# Patient Record
Sex: FEMALE | Race: WHITE | NOT HISPANIC OR LATINO | Employment: FULL TIME | URBAN - METROPOLITAN AREA
[De-identification: names, ages, dates, MRNs, and addresses within clinical notes are randomized per-mention and may not be internally consistent; named-entity substitution may affect disease eponyms.]

---

## 2018-06-06 ENCOUNTER — PROCEDURE VISIT (OUTPATIENT)
Dept: FAMILY MEDICINE | Facility: CLINIC | Age: 33
End: 2018-06-06
Payer: COMMERCIAL

## 2018-06-06 ENCOUNTER — HOSPITAL ENCOUNTER (EMERGENCY)
Facility: HOSPITAL | Age: 33
Discharge: HOME | End: 2018-06-06
Attending: EMERGENCY MEDICINE
Payer: COMMERCIAL

## 2018-06-06 ENCOUNTER — APPOINTMENT (OUTPATIENT)
Dept: CT IMAGING | Facility: HOSPITAL | Age: 33
End: 2018-06-06
Payer: COMMERCIAL

## 2018-06-06 ENCOUNTER — TELEPHONE (OUTPATIENT)
Dept: EMERGENCY MEDICINE | Facility: HOSPITAL | Age: 33
End: 2018-06-06

## 2018-06-06 ENCOUNTER — APPOINTMENT (OUTPATIENT)
Dept: FAMILY MEDICINE | Facility: CLINIC | Age: 33
End: 2018-06-06
Payer: COMMERCIAL

## 2018-06-06 VITALS
HEART RATE: 73 BPM | DIASTOLIC BLOOD PRESSURE: 58 MMHG | SYSTOLIC BLOOD PRESSURE: 116 MMHG | OXYGEN SATURATION: 98 % | RESPIRATION RATE: 18 BRPM

## 2018-06-06 VITALS
WEIGHT: 162.04 LBS | DIASTOLIC BLOOD PRESSURE: 60 MMHG | TEMPERATURE: 98.4 F | RESPIRATION RATE: 20 BRPM | OXYGEN SATURATION: 98 % | SYSTOLIC BLOOD PRESSURE: 91 MMHG | HEIGHT: 60 IN | BODY MASS INDEX: 31.81 KG/M2 | HEART RATE: 71 BPM

## 2018-06-06 DIAGNOSIS — Z30.432 ENCOUNTER FOR IUD REMOVAL: Primary | ICD-10-CM

## 2018-06-06 DIAGNOSIS — K59.00 CONSTIPATION, UNSPECIFIED CONSTIPATION TYPE: ICD-10-CM

## 2018-06-06 DIAGNOSIS — R10.10 PAIN OF UPPER ABDOMEN: Primary | ICD-10-CM

## 2018-06-06 LAB
ALBUMIN SERPL-MCNC: 4.2 G/DL (ref 3.5–5.3)
ALP SERPL-CCNC: 65 U/L (ref 37–98)
ALT SERPL-CCNC: 10 U/L (ref 0–52)
ANION GAP SERPL CALC-SCNC: 8 MMOL/L (ref 3–11)
AST SERPL-CCNC: 10 U/L (ref 0–39)
BACTERIA #/AREA URNS AUTO: ABNORMAL /HPF
BASOPHILS # BLD AUTO: 0 10*3/UL
BASOPHILS NFR BLD AUTO: 0 % (ref 0–2)
BILIRUB SERPL-MCNC: 0.38 MG/DL (ref 0–1.4)
BILIRUB UR QL STRIP.AUTO: NEGATIVE
BUN SERPL-MCNC: 7 MG/DL (ref 7–25)
CALCIUM ALBUM COR SERPL-MCNC: 8.9 MG/DL (ref 8.5–10.1)
CALCIUM SERPL-MCNC: 9.1 MG/DL (ref 8.6–10.3)
CHLORIDE SERPL-SCNC: 104 MMOL/L (ref 98–107)
CLARITY UR: ABNORMAL
CO2 SERPL-SCNC: 24 MMOL/L (ref 21–32)
COLOR UR: YELLOW
CREAT SERPL-MCNC: 0.7 MG/DL (ref 0.6–1.2)
EOSINOPHIL # BLD AUTO: 0.3 10*3/UL
EOSINOPHIL NFR BLD AUTO: 3 % (ref 0–3)
ERYTHROCYTE [DISTWIDTH] IN BLOOD BY AUTOMATED COUNT: 12.7 % (ref 11.5–14)
GFR SERPL CREATININE-BSD FRML MDRD: 96 ML/MIN/1.73M*2
GLUCOSE SERPL-MCNC: 100 MG/DL (ref 70–105)
GLUCOSE UR STRIP.AUTO-MCNC: NEGATIVE MG/DL
HCG SERPL QL: NEGATIVE
HCT VFR BLD AUTO: 39.6 % (ref 34–45)
HGB BLD-MCNC: 14.2 G/DL (ref 11.5–15.5)
HGB UR QL STRIP.AUTO: ABNORMAL
KETONES UR STRIP.AUTO-MCNC: NEGATIVE MG/DL
LEUKOCYTE ESTERASE UR QL STRIP: ABNORMAL
LIPASE SERPL-CCNC: 12 U/L (ref 11–82)
LYMPHOCYTES # BLD AUTO: 2.9 10*3/UL
LYMPHOCYTES NFR BLD AUTO: 26 % (ref 11–47)
MCH RBC QN AUTO: 31.6 PG (ref 28–33)
MCHC RBC AUTO-ENTMCNC: 35.8 G/DL (ref 32–36)
MCV RBC AUTO: 88.1 FL (ref 81–97)
MONOCYTES # BLD AUTO: 0.7 10*3/UL
MONOCYTES NFR BLD AUTO: 7 % (ref 3–11)
NEUTROPHILS # BLD AUTO: 7.1 10*3/UL
NEUTROPHILS NFR BLD AUTO: 64 % (ref 41–81)
NITRITE UR QL STRIP.AUTO: NEGATIVE
PH UR STRIP.AUTO: 6 PH
PLATELET # BLD AUTO: 294 10*3/UL (ref 140–350)
PMV BLD AUTO: 7.1 FL (ref 6.9–10.8)
POTASSIUM SERPL-SCNC: 3.7 MMOL/L (ref 3.5–5.1)
PROT SERPL-MCNC: 6.9 G/DL (ref 6–8.3)
PROT UR STRIP.AUTO-MCNC: NEGATIVE MG/DL
RBC # BLD AUTO: 4.5 10*6/ΜL (ref 3.7–5.3)
RBC #/AREA URNS AUTO: ABNORMAL /HPF
SODIUM SERPL-SCNC: 136 MMOL/L (ref 135–145)
SP GR UR STRIP.AUTO: 1.01 (ref 1–1.03)
SQUAMOUS #/AREA URNS AUTO: ABNORMAL /HPF
UROBILINOGEN UR STRIP.AUTO-MCNC: <2 E.U./DL
WBC # BLD AUTO: 11.2 10*3/UL (ref 4.5–10.5)
WBC #/AREA URNS AUTO: ABNORMAL /HPF

## 2018-06-06 PROCEDURE — 99285 EMERGENCY DEPT VISIT HI MDM: CPT | Performed by: EMERGENCY MEDICINE

## 2018-06-06 PROCEDURE — 99284 EMERGENCY DEPT VISIT MOD MDM: CPT

## 2018-06-06 PROCEDURE — 96374 THER/PROPH/DIAG INJ IV PUSH: CPT

## 2018-06-06 PROCEDURE — 2590000100 HC RX 259: Performed by: NURSE PRACTITIONER

## 2018-06-06 PROCEDURE — 58301 REMOVE INTRAUTERINE DEVICE: CPT | Mod: NC | Performed by: FAMILY MEDICINE

## 2018-06-06 PROCEDURE — 6360000200 HC RX 636 W HCPCS (ALT 250 FOR IP): Performed by: NURSE PRACTITIONER

## 2018-06-06 PROCEDURE — 87088 URINE BACTERIA CULTURE: CPT | Performed by: NURSE PRACTITIONER

## 2018-06-06 PROCEDURE — 80053 COMPREHEN METABOLIC PANEL: CPT | Performed by: NURSE PRACTITIONER

## 2018-06-06 PROCEDURE — 81001 URINALYSIS AUTO W/SCOPE: CPT | Performed by: NURSE PRACTITIONER

## 2018-06-06 PROCEDURE — 2550000100 HC RX 255: Performed by: NURSE PRACTITIONER

## 2018-06-06 PROCEDURE — 85025 COMPLETE CBC W/AUTO DIFF WBC: CPT | Performed by: NURSE PRACTITIONER

## 2018-06-06 PROCEDURE — 74177 CT ABD & PELVIS W/CONTRAST: CPT

## 2018-06-06 PROCEDURE — 84703 CHORIONIC GONADOTROPIN ASSAY: CPT | Performed by: NURSE PRACTITIONER

## 2018-06-06 PROCEDURE — 2500000200 HC RX 250 WO HCPCS: Performed by: NURSE PRACTITIONER

## 2018-06-06 PROCEDURE — 83690 ASSAY OF LIPASE: CPT | Performed by: NURSE PRACTITIONER

## 2018-06-06 PROCEDURE — 58301 REMOVE INTRAUTERINE DEVICE: CPT | Performed by: FAMILY MEDICINE

## 2018-06-06 PROCEDURE — 2580000300 HC RX 258: Performed by: NURSE PRACTITIONER

## 2018-06-06 PROCEDURE — 96375 TX/PRO/DX INJ NEW DRUG ADDON: CPT

## 2018-06-06 RX ORDER — SODIUM CHLORIDE 9 MG/ML
1000 INJECTION, SOLUTION INTRAVENOUS ONCE
Status: COMPLETED | OUTPATIENT
Start: 2018-06-06 | End: 2018-06-06

## 2018-06-06 RX ORDER — MORPHINE SULFATE 4 MG/ML
4 INJECTION, SOLUTION INTRAMUSCULAR; INTRAVENOUS ONCE
Status: COMPLETED | OUTPATIENT
Start: 2018-06-06 | End: 2018-06-06

## 2018-06-06 RX ORDER — IOPAMIDOL 755 MG/ML
105 INJECTION, SOLUTION INTRAVASCULAR ONCE
Status: COMPLETED | OUTPATIENT
Start: 2018-06-06 | End: 2018-06-06

## 2018-06-06 RX ORDER — ONDANSETRON HYDROCHLORIDE 2 MG/ML
4 INJECTION, SOLUTION INTRAVENOUS ONCE
Status: COMPLETED | OUTPATIENT
Start: 2018-06-06 | End: 2018-06-06

## 2018-06-06 RX ADMIN — IOPAMIDOL 105 ML: 755 INJECTION, SOLUTION INTRAVENOUS at 05:10

## 2018-06-06 RX ADMIN — ONDANSETRON 4 MG: 2 INJECTION INTRAMUSCULAR; INTRAVENOUS at 04:26

## 2018-06-06 RX ADMIN — SODIUM CHLORIDE 1000 ML: 9 INJECTION, SOLUTION INTRAVENOUS at 04:25

## 2018-06-06 RX ADMIN — LIDOCAINE HYDROCHLORIDE 45 ML: 20 SOLUTION ORAL; TOPICAL at 04:22

## 2018-06-06 RX ADMIN — MORPHINE SULFATE 4 MG: 4 INJECTION INTRAVENOUS at 04:28

## 2018-06-06 ASSESSMENT — ENCOUNTER SYMPTOMS
BACK PAIN: 0
SHORTNESS OF BREATH: 0
SEIZURES: 0
PALPITATIONS: 0
ARTHRALGIAS: 0
HEMATURIA: 0
VOMITING: 0
BRUISES/BLEEDS EASILY: 0
COLOR CHANGE: 0
ADENOPATHY: 0
COUGH: 0
FEVER: 0
ABDOMINAL PAIN: 1
CHILLS: 0
DYSURIA: 0

## 2018-06-06 ASSESSMENT — PAIN DESCRIPTION - DESCRIPTORS: DESCRIPTORS: SHARP

## 2018-06-06 NOTE — ED PROVIDER NOTES
HPI:  Chief Complaint   Patient presents with   • Abdominal Pain     pain x 5 days, has not been able to eat the last three days, c/o nausea, denies vomiting       32-year-old female presents with 3 days of constant generalized abdominal distention, bloating and dull discomfort rated at a 7 out of 10 that is continuous and not improved by the use of ibuprofen or Pepto-Bismol.  She also reports associated symptoms of feeling hot and sweaty without fever or chills.  She denies any dysuria.  Last menstrual period began June 1, 2017 and this was normal for her.  Her bowel movements has altered between solid and liquid without improvement of her distended feeling.  She also reports associated anorexia.  In addition she reports some nausea without vomiting.  States a similar episode 2 weeks ago for 3-4 days that removal resolved on its own where her nausea was more prominent at that time.  Symptoms do not improve or worsen with any dietary changes.  She does not suspect any suspicious food intake.  No previous abdominal surgeries.        Abdominal Pain   Associated symptoms: no chest pain, no chills, no cough, no dysuria, no fever, no hematuria, no shortness of breath, no vaginal bleeding, no vaginal discharge and no vomiting        HISTORY:  Past Medical History:   Diagnosis Date   • Fallot tetralogy    • Ventricular septal defect        Past Surgical History:   Procedure Laterality Date   • CARDIAC SURGERY     • TONSILLECTOMY         History reviewed. No pertinent family history.    Social History   Substance Use Topics   • Smoking status: Current Every Day Smoker     Packs/day: 0.50     Types: Cigarettes   • Smokeless tobacco: Never Used   • Alcohol use Yes      Comment: rarely       ROS:  Review of Systems   Constitutional: Negative for chills and fever.   HENT: Negative.    Eyes: Negative for visual disturbance.   Respiratory: Negative for cough and shortness of breath.    Cardiovascular: Negative for chest pain  and palpitations.   Gastrointestinal: Positive for abdominal pain. Negative for vomiting.   Endocrine: Positive for heat intolerance.   Genitourinary: Negative for dysuria, hematuria, vaginal bleeding, vaginal discharge and vaginal pain.   Musculoskeletal: Negative for arthralgias and back pain.   Skin: Negative for color change and rash.   Neurological: Negative for seizures and syncope.   Hematological: Negative for adenopathy. Does not bruise/bleed easily.   All other systems reviewed and are negative.      PE:  ED Triage Vitals   Temp Heart Rate Resp BP SpO2   06/06/18 0338 06/06/18 0338 06/06/18 0338 06/06/18 0338 06/06/18 0338   36.9 °C (98.4 °F) 82 16 125/67 95 %      Temp Source Heart Rate Source Patient Position BP Location FiO2 (%)   06/06/18 0338 -- 06/06/18 0430 -- --   Oral  Sitting         Physical Exam   Constitutional: She is oriented to person, place, and time. She appears well-developed and well-nourished. No distress.   HENT:   Head: Normocephalic and atraumatic.   Eyes: Conjunctivae are normal.   Neck: Neck supple.   Cardiovascular: Normal rate, regular rhythm and normal pulses.    Murmur heard.   Systolic murmur is present with a grade of 3/6    No diastolic murmur is present   Pulmonary/Chest: Effort normal and breath sounds normal. No respiratory distress.   Abdominal: Soft. Bowel sounds are normal. She exhibits distension. She exhibits no fluid wave and no abdominal bruit. There is no hepatosplenomegaly. There is tenderness in the right upper quadrant, right lower quadrant, epigastric area and left upper quadrant. There is tenderness at McBurney's point. There is no rigidity, no rebound, no guarding and no CVA tenderness.   Musculoskeletal: Normal range of motion. She exhibits no edema.   Neurological: She is alert and oriented to person, place, and time.   Skin: Skin is warm and dry.   Psychiatric: She has a normal mood and affect.   Nursing note and vitals reviewed.      ED LABS:  Labs  Reviewed   CBC WITH AUTO DIFFERENTIAL - Abnormal        Result Value    WBC 11.2 (*)     RBC 4.50      Hemoglobin 14.2      Hematocrit 39.6      MCV 88.1      MCH 31.6      MCHC 35.8      RDW 12.7      Platelets 294      MPV 7.1      Neutrophils% 64      Lymphocytes% 26      Monocytes% 7      Eosinophils% 3      Basophils% 0      Neutrophils Absolute 7.10      Lymphocytes Absolute 2.90      Monocytes Absolute 0.70      Eosinophils Absolute 0.30      Basophils Absolute 0.00     URINALYSIS, MICROSCOPIC ONLY - Abnormal     RBC, Urine 15-29 (*)     WBC, Urine 0-4      Squamous Epithelial, Urine 0-4      Bacteria, Urine None seen     URINALYSIS, DIPSTICK ONLY, FOR USE WITH MICROSCOPIC PANEL - Abnormal     Color, Urine Yellow      Clarity, Urine Slightly Cloudy (*)     Specific Gravity, Urine 1.015      Leukocytes, Urine Moderate (*)     Nitrite, Urine Negative      Protein, Urine Negative      Ketones, Urine Negative      Urobilinogen, Urine <2.0      Bilirubin, Urine Negative      Blood, Urine Large (*)     Glucose, Urine Negative      pH, Urine 6.0     COMPREHENSIVE METABOLIC PANEL - Normal    Sodium 136      Potassium 3.7      Chloride 104      CO2 24      Anion Gap 8      BUN 7      Creatinine 0.7      Glucose 100      Calcium 9.1      AST 10      ALT (SGPT) 10      Alkaline Phosphatase 65      Total Protein 6.9      Albumin 4.2      Total Bilirubin 0.38      eGFR 96      Corrected Calcium 8.9      Narrative:     ESTIMATED GFR CALCULATED USING THE IDMS-TRACEABLE MDRD STUDY EQUATION WITH THE RESULT NORMALIZED TO 1.73M^2 BODY SURFACE AREA.    AVERAGE GFR BY AGE RANGE     20-30 years 116 mL/min/1.73m^2  30-40 years 107 mL/min/1.73m^2  40-50 years 99 mL/min/1.73m^2  50-60 years 93 mL/min/1.73m^2  60-70 years 85 mL/min/1.73m^2  70-up years 75 mL/min/1.73m^2   LIPASE - Normal    Lipase 12     HCG, RAPID QUALITATIVE, SERUM    HCG qualitative Negative     URINALYSIS WITH MICROSCOPIC    Narrative:     The following orders  were created for panel order Urinalysis w/microscopic Urine, Clean Catch.  Procedure                               Abnormality         Status                     ---------                               -----------         ------                     Urinalysis, microscopic U...[[id number]]  Abnormal            Final result               Urinalysis, dipstick Urin...[[id number]]  Abnormal            Final result                 Please view results for these tests on the individual orders.         ED IMAGES:  CT ABDOMEN PELVIS W IV CONTRAST No Oral Contrast   Final Result   Impression:    IUD with one of the struts appearing in the posterior myometrium to the serosal surface. Would correlate with when the IUD was placed and whether it could contribute to the patient's abdominal pain.      3 mm pulmonary nodule in the left lower lobe. Per Fleischner criteria, follow-up is not needed.      REPORT NOTE:   This study has been reviewed by [facility]eradiology services with faxing of similar results to the [facility] ED at the time of the exam. No comment about the location of the IUD was made.             ED PROCEDURES:  Procedures    ED COURSE:  ED Course as of Jun 07 1902 Wed Jun 06, 2018   0509 Symptoms somewhat improved although not completely resolved.  CT results indicate moderate fecal retention.  3 mm nodule left lower lobe that will need follow-up with primary care.  Patient has been provided with family medicine residency and [facility] numbers in order to establish care and follow-up.  She is comfortable with discharge.  [AJ]      ED Course User Index  [AJ] Ana Gloria CNP       MDM:  MDM  32-year-old female presents with recurrent abdominal distention bloating and ongoing discomfort.  She initially describes upper abdomen and epigastric discomfort however on exam there is right lower quadrant tenderness without rebound tenderness or guarding.  She does have her gallbladder and appendix.  She  denies any vaginal discharge or to suspect pelvic etiology will obtain CBC, CMP, lipase, hCG and CT of the abdomen and pelvis with IV contrast.  In the interim provide pain medications and antiemetics.  Final diagnoses:   [R10.10] Pain of upper abdomen   [K59.00] Constipation, unspecified constipation type        Ana Gloria CNP  06/07/18 7991

## 2018-06-06 NOTE — DISCHARGE INSTRUCTIONS
Your CAT scan of the abdomen does not show significant abnormalities such as appendicitis, gallbladder problems or intestinal blockage.  Please follow with family medicine residency clinic or [facility] or primary care provider of your choice in follow-up for your symptoms.

## 2018-06-06 NOTE — ED ATTESTATION NOTE
I performed a history and physical examination of Kenya Lopez and discussed her   management with Advanced Practice Provider, Ana Gloria CNP.  I agree with the history, physical, assessment, and plan of care.  On my face-to-face visit with patient,  She is in no acute distress, abdominal exam is benign.      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  She has not established primary care in the area.  She just started a new job yesterday.  She reports symptoms of alternating constipation and diarrhea.  Pain at times feels like extra gas and constipation but is not alleviated with bowel movements.  She is somewhat vague as to why she is not establish primary care, cardiology care in the area or anything else.  No job was started yesterday.  Denies any dysuria.  She recently had a period.  She has an IUD in place.  No fevers.  No chest pain or shortness of breath.      Results for orders placed or performed during the hospital encounter of 06/06/18   CBC w/auto differential Blood, Venous   Result Value Ref Range    WBC 11.2 (H) 4.5 - 10.5 10*3/uL    RBC 4.50 3.70 - 5.30 10*6/µL    Hemoglobin 14.2 11.5 - 15.5 g/dL    Hematocrit 39.6 34.0 - 45.0 %    MCV 88.1 81.0 - 97.0 fL    MCH 31.6 28.0 - 33.0 pg    MCHC 35.8 32.0 - 36.0 g/dL    RDW 12.7 11.5 - 14.0 %    Platelets 294 140 - 350 10*3/uL    MPV 7.1 6.9 - 10.8 fL    Neutrophils% 64 41 - 81 %    Lymphocytes% 26 11 - 47 %    Monocytes% 7 3 - 11 %    Eosinophils% 3 0 - 3 %    Basophils% 0 0 - 2 %    Neutrophils Absolute 7.10 10*3/uL    Lymphocytes Absolute 2.90 10*3/uL    Monocytes Absolute 0.70 10*3/uL    Eosinophils Absolute 0.30 10*3/uL    Basophils Absolute 0.00 10*3/uL   Comprehensive metabolic panel Blood, Venous   Result Value Ref Range    Sodium 136 135 - 145 mmol/L    Potassium 3.7 3.5 - 5.1 mmol/L    Chloride 104 98 - 107 mmol/L    CO2 24 21 - 32 mmol/L    Anion Gap 8 3 - 11 mmol/L    BUN 7 7 - 25 mg/dL    Creatinine 0.7 0.6 - 1.2  mg/dL    Glucose 100 70 - 105 mg/dL    Calcium 9.1 8.6 - 10.3 mg/dL    AST 10 0 - 39 U/L    ALT (SGPT) 10 0 - 52 U/L    Alkaline Phosphatase 65 37 - 98 U/L    Total Protein 6.9 6.0 - 8.3 g/dL    Albumin 4.2 3.5 - 5.3 g/dL    Total Bilirubin 0.38 0.00 - 1.40 mg/dL    eGFR 96 >60 mL/min/1.73m*2    Corrected Calcium 8.9 8.5 - 10.1 mg/dL   Lipase Blood, Venous   Result Value Ref Range    Lipase 12 11 - 82 U/L   HCG, rapid qualitative, serum Blood, Venous   Result Value Ref Range    HCG qualitative Negative    Urinalysis, microscopic Urine, Clean Catch   Result Value Ref Range    RBC, Urine 15-29 (A) None seen, 0-4, Negative /HPF    WBC, Urine 0-4 0 - 4 /HPF    Squamous Epithelial, Urine 0-4 None Seen-9 /HPF    Bacteria, Urine None seen None seen, Few /HPF   Urinalysis, dipstick Urine, Clean Catch   Result Value Ref Range    Color, Urine Yellow Yellow    Clarity, Urine Slightly Cloudy (A) Clear    Specific Gravity, Urine 1.015 1.003 - 1.030    Leukocytes, Urine Moderate (A) Negative    Nitrite, Urine Negative Negative    Protein, Urine Negative Negative mg/dL    Ketones, Urine Negative Negative mg/dL    Urobilinogen, Urine <2.0 <2.0 E.U./dL    Bilirubin, Urine Negative Negative    Blood, Urine Large (A) Negative    Glucose, Urine Negative Negative mg/dL    pH, Urine 6.0 5.0 - 8.0 PH         CT per [facility] shows normal appendix, moderate fecal retention, IUD in place, 3 mm nodule in the left lower lobe on image 9.    No clear etiology of patient's abdominal pain is elicited on laboratory diagnostics on her CT evaluation today.  She does have some mild hematuria without signs of infection.  No dysuria.  She did just recently finished her menses.  Will however add a urine culture but hold on any antibiotics at this time.  Will only call with positive results.  I strongly encouraged her to establish with a primary care provider.  She does describe symptoms of alternating constipation and diarrhea this may be  related to an IBS situation.  With her history and comorbidity she is certainly in need of a primary care provider, cardiologist if she is staying in the area.  Information has been provided.        MD ABEL MARIN MD Kirsten Busse, MD  06/06/18 3417

## 2018-06-06 NOTE — PROGRESS NOTES
Subjective      Kenya Lopez is a 32 y.o. female who presents for IUD removal.    HPI   Kenya is a 32-year-old female recently seen in the ED for lower mid abdominal pain.  Her workup was unremarkable beyond the CT which suggested her IUD may be in the myometrium.  She followed up with our clinic today, the case was discussed with OB/GYN who recommended removal of the IUD as the hormonal type can sometimes dissolve into the uterus itself.    She does think she has had symptoms related to the IUD for months.  She notes some bloating, abdominal pain, change in bowel habits over the past few weeks.    She is sexually active.  One partner.    The following have been reviewed and updated as appropriate in this visit:  No text in SmartText        No Known Allergies  Prior to Admission medications    Not on File      Past Medical History:   Diagnosis Date   • Fallot tetralogy    • Ventricular septal defect      Past Surgical History:   Procedure Laterality Date   • CARDIAC SURGERY     • TONSILLECTOMY       No family history on file.  Social History     Social History   • Marital status: Single     Spouse name: N/A   • Number of children: N/A   • Years of education: N/A     Social History Main Topics   • Smoking status: Current Every Day Smoker     Packs/day: 0.50     Types: Cigarettes   • Smokeless tobacco: Never Used   • Alcohol use Yes      Comment: rarely   • Drug use: No   • Sexual activity: Defer     Other Topics Concern   • None     Social History Narrative   • None       10 point ROS negative except as in HPI    Objective     Vital Signs:  /58 (BP Location: Left arm, Patient Position: Sitting, Cuff Size: Reg)   Pulse 73   Resp 18   SpO2 98%     Labs:  Results for orders placed or performed during the hospital encounter of 06/06/18   CBC w/auto differential Blood, Venous   Result Value Ref Range    WBC 11.2 (H) 4.5 - 10.5 10*3/uL    RBC 4.50 3.70 - 5.30 10*6/µL    Hemoglobin 14.2 11.5 - 15.5 g/dL     Hematocrit 39.6 34.0 - 45.0 %    MCV 88.1 81.0 - 97.0 fL    MCH 31.6 28.0 - 33.0 pg    MCHC 35.8 32.0 - 36.0 g/dL    RDW 12.7 11.5 - 14.0 %    Platelets 294 140 - 350 10*3/uL    MPV 7.1 6.9 - 10.8 fL    Neutrophils% 64 41 - 81 %    Lymphocytes% 26 11 - 47 %    Monocytes% 7 3 - 11 %    Eosinophils% 3 0 - 3 %    Basophils% 0 0 - 2 %    Neutrophils Absolute 7.10 10*3/uL    Lymphocytes Absolute 2.90 10*3/uL    Monocytes Absolute 0.70 10*3/uL    Eosinophils Absolute 0.30 10*3/uL    Basophils Absolute 0.00 10*3/uL   Comprehensive metabolic panel Blood, Venous   Result Value Ref Range    Sodium 136 135 - 145 mmol/L    Potassium 3.7 3.5 - 5.1 mmol/L    Chloride 104 98 - 107 mmol/L    CO2 24 21 - 32 mmol/L    Anion Gap 8 3 - 11 mmol/L    BUN 7 7 - 25 mg/dL    Creatinine 0.7 0.6 - 1.2 mg/dL    Glucose 100 70 - 105 mg/dL    Calcium 9.1 8.6 - 10.3 mg/dL    AST 10 0 - 39 U/L    ALT (SGPT) 10 0 - 52 U/L    Alkaline Phosphatase 65 37 - 98 U/L    Total Protein 6.9 6.0 - 8.3 g/dL    Albumin 4.2 3.5 - 5.3 g/dL    Total Bilirubin 0.38 0.00 - 1.40 mg/dL    eGFR 96 >60 mL/min/1.73m*2    Corrected Calcium 8.9 8.5 - 10.1 mg/dL   Lipase Blood, Venous   Result Value Ref Range    Lipase 12 11 - 82 U/L   HCG, rapid qualitative, serum Blood, Venous   Result Value Ref Range    HCG qualitative Negative    Urinalysis, microscopic Urine, Clean Catch   Result Value Ref Range    RBC, Urine 15-29 (A) None seen, 0-4, Negative /HPF    WBC, Urine 0-4 0 - 4 /HPF    Squamous Epithelial, Urine 0-4 None Seen-9 /HPF    Bacteria, Urine None seen None seen, Few /HPF   Urinalysis, dipstick Urine, Clean Catch   Result Value Ref Range    Color, Urine Yellow Yellow    Clarity, Urine Slightly Cloudy (A) Clear    Specific Gravity, Urine 1.015 1.003 - 1.030    Leukocytes, Urine Moderate (A) Negative    Nitrite, Urine Negative Negative    Protein, Urine Negative Negative mg/dL    Ketones, Urine Negative Negative mg/dL    Urobilinogen, Urine <2.0 <2.0 E.U./dL     Bilirubin, Urine Negative Negative    Blood, Urine Large (A) Negative    Glucose, Urine Negative Negative mg/dL    pH, Urine 6.0 5.0 - 8.0 PH       Physical Exam:  GENERAL: No acute distress, average body habitus, no foul or apparent body odors  HEENT: Head normocephalic atraumatic.   ABDOMEN: Soft, nondistended, nontender.   NEUROLOGIC: No focal deficits, CN II-XII grossly intact  PSYCHIATRIC: Alert, calm, cooperative, friendly to examiner  SKIN: Without rashes or bruising  EXTREMITIES: No edema  GYN: Normal-appearing vaginal mucosa, cervix without exudate, soft 2 strings present    IUD Removal  Date/Time: 6/6/2018 4:11 PM  Performed by: ONIEL BURGOS      Consent  Verbal consent was obtained from the patient. Written consent was obtained from the patient. Risks, benefits, and alternatives were discussed. Consent given by patient. The patient states understanding of the procedure being performed. Patient ID confirmed verbally with patient.       Pre-Procedure  A Mirena IUD was originally inserted on 1/1/2014. IUD removal due to side effects.     Procedure Details   Kenya was placed in a dorsal lithotomy position. Pelvic exam performed and was normal. Speculum was inserted into the vagina. IUD strings grasped with ring forceps. IUD removed entirely intact and without complication.     Post-Procedure  Patient tolerated the procedure well with no complications.     Procedure Comments  W/ tenaculum control      Assessment/Plan   Diagnoses and all orders for this visit:    Encounter for IUD removal  IUD removed without complication.  Monitor for worsening bleeding.  Expect some cramping and spotting over the next few days.  Recommended abstinence her at a minimum condoms for the next few weeks.  She will call to let us know which birth control option she would like to pursue.  Return if any other concerns arise.      ONIEL BURGOS MD

## 2018-06-07 LAB — BACTERIA UR CULT: NORMAL

## 2018-06-07 NOTE — PROGRESS NOTES
FYI.  This patient has an appointment with you on 6/20/18 and will be establishing care.  Also appears that she has a tetrology of fallot s/p repair

## 2018-06-07 NOTE — ED NOTES
Patient called back after Dr. Rivera called to check on her earlier today. Patient educated that it is very important to have follow-up with PCP or OB/GYN due to radiologist over-read of CT scan regarding the placement of her IUD.  Patient voiced understanding and states will make appointment as soon as we hung up phone.     NICOLAS Bianchi  06/06/18 [id number]

## 2018-06-13 ENCOUNTER — OFFICE VISIT (OUTPATIENT)
Dept: FAMILY MEDICINE | Facility: CLINIC | Age: 33
End: 2018-06-13
Payer: COMMERCIAL

## 2018-06-13 VITALS
BODY MASS INDEX: 31.05 KG/M2 | DIASTOLIC BLOOD PRESSURE: 73 MMHG | OXYGEN SATURATION: 97 % | RESPIRATION RATE: 18 BRPM | WEIGHT: 159 LBS | SYSTOLIC BLOOD PRESSURE: 108 MMHG | HEART RATE: 77 BPM | TEMPERATURE: 97.1 F

## 2018-06-13 DIAGNOSIS — R10.30 LOWER ABDOMINAL PAIN: Primary | ICD-10-CM

## 2018-06-13 PROBLEM — Z30.432 ENCOUNTER FOR IUD REMOVAL: Status: RESOLVED | Noted: 2018-06-06 | Resolved: 2018-06-13

## 2018-06-13 LAB
BACTERIA #/AREA URNS HPF: NORMAL /HPF
BILIRUB UR QL: NEGATIVE
CLARITY UR: CLEAR
COLOR UR: YELLOW
GLUCOSE UR QL: NEGATIVE MG/DL
HGB UR QL: ABNORMAL
KETONES UR-MCNC: NEGATIVE MG/DL
LEUKOCYTE ESTERASE UR QL STRIP: NEGATIVE
NITRITE UR QL: NEGATIVE
PH UR: 6.5 PH
PROT UR STRIP-MCNC: NEGATIVE MG/DL
RBC #/AREA URNS HPF: NORMAL /HPF
SP GR UR: <=1.005 (ref 1–1.03)
SQUAMOUS #/AREA URNS HPF: NORMAL /HPF
UROBILINOGEN UR-MCNC: 0.2 E.U./DL
WBC #/AREA URNS HPF: NORMAL /HPF

## 2018-06-13 PROCEDURE — 99213 OFFICE O/P EST LOW 20 MIN: CPT | Performed by: FAMILY MEDICINE

## 2018-06-13 PROCEDURE — 81001 URINALYSIS AUTO W/SCOPE: CPT | Performed by: FAMILY MEDICINE

## 2018-06-13 ASSESSMENT — PAIN SCALES - GENERAL: PAINLEVEL: 3

## 2018-06-13 NOTE — PROGRESS NOTES
Subjective      Kenya Lopez is a 32 y.o. female who presents for follow-up stomach pain    HPI   Eileen is a 32-year-old  female.  She notes lower mid abdominal pain beginning about 3.5 weeks ago.    Went away after 4-5 days, came back.  When the pain came back she then went to the ED , had labs which were normal, UA with hematuria, leukocyte esterase but not a rip roaring UTI, CT which was normal other than IUD which was potentially in the myometrium.      Had removed here a day later at the recommendation of OB/GYN.  Pain resolved for a day or 2.    Pain came back severe again 6/10 - during the night pain woke her up, was hurting down low in abdomen, lowest part of stomach, down and into groin, severe, every time she goes to the bathroom it 'hurts' when she feels like bladder is full.    It hurts every time she pees, more the muscles of the bladder then the introitus, no periurethral pains    No blood in urine, no fevers/chills, no flank pain    Regular bowel movements, thought it was gas initially, no relief with passing gas, no relief with bowel movements.    Is lactose intolerant - tried to avoid, doesn't seem to effect symptoms      -  x3, no real issues with these, last kid born     The following have been reviewed and updated as appropriate in this visit:  No text in SmartText        No Known Allergies  Prior to Admission medications    Not on File      Past Medical History:   Diagnosis Date   • Fallot tetralogy    • Ventricular septal defect      Past Surgical History:   Procedure Laterality Date   • CARDIAC SURGERY     • TONSILLECTOMY       History reviewed. No pertinent family history.  Social History     Social History   • Marital status: Single     Spouse name: N/A   • Number of children: N/A   • Years of education: N/A     Social History Main Topics   • Smoking status: Current Every Day Smoker     Packs/day: 0.50     Types: Cigarettes   • Smokeless tobacco: Never Used   •  Alcohol use Yes      Comment: rarely   • Drug use: No   • Sexual activity: Defer     Other Topics Concern   • None     Social History Narrative   • None       10 point ROS negative except as in HPI    Objective     Vital Signs:  /73 (BP Location: Left arm, Patient Position: Sitting, Cuff Size: Reg)   Pulse 77   Temp 36.2 °C (97.1 °F) (Temporal)   Resp 18   Wt 72.1 kg (159 lb)   SpO2 97%   BMI 31.05 kg/m²     Labs:  Results for orders placed or performed in visit on 06/13/18   Urinalysis, dipstick Urine, Clean Catch   Result Value Ref Range    Color, Urine Yellow Yellow    Clarity, Urine Clear Clear    pH, Urine 6.5 5.0 - 8.0 PH    Specific Gravity, Urine <=1.005 1.003 - 1.030    Protein, Urine Negative Negative mg/dL    Glucose, Urine Negative Negative mg/dL    Ketones, Urine Negative Negative mg/dL    Blood, Urine Small (A) Negative    Nitrite, Urine Negative Negative    Bilirubin, Urine Negative Negative    Leukocytes, Urine Negative Negative    Urobilinogen, Urine 0.2 <2.0 E.U./dL   Urinalysis, microscopic Urine, Clean Catch   Result Value Ref Range    RBC, Urine 0-4 None seen, 0-4, Negative /HPF    WBC, Urine 0-4 0 - 4 /HPF    Squamous Epithelial, Urine None seen None Seen-9 /HPF    Bacteria, Urine None seen None seen, Few /HPF       Physical Exam:  GENERAL: No acute distress, overweight body habitus, no foul or apparent body odors  HEENT: Head normocephalic atraumatic. Moist mucous membranes. Nares clear without crusts or sores. Oropharynx without erythema or exudates. Pupils are round equally reactive to light. Sclera nonicteric. Conjunctiva noninjected.  CARDIOVASCULAR: Regular rate and rhythm with normal S1 and S2 and without appreciable murmurs.  Pulses present and equal bilaterally.   PULMONARY: Lungs are clear to auscultation bilaterally with normal work of breathing.  No wheezes or crackles noted.  ABDOMEN: Soft, nondistended.  Bowel sounds present and normoactive.  Mild suprapubic  tenderness.  Mild guarding with palpation.  No CVA tenderness.  NEUROLOGIC: No focal deficits, CN II-XII grossly intact  PSYCHIATRIC: Alert, calm, cooperative, friendly to examiner  SKIN: Without rashes or bruising  EXTREMITIES: No edema    Assessment/Plan   Diagnoses and all orders for this visit:    Lower abdominal pain /bladder muscle pain  32-year-old otherwise healthy female with a few week history of lower mid abdominal pain.  Seems to be related to needing to urinate, worse with distended bladder, somewhat relieved with voiding but still present post void.  She had an IUD in place, since been removed.  This did not seem to relieve her symptoms.  Her symptoms do not seem to be related to her periods although her menstruation has been off due to the IUD.  We will recheck her urine today.  We will also check her for H. pylori although I do not have a high suspicion for this.  Reassurance was provided.  She had a CT done just a week or so ago that was normal beyond the IUD which we have since removed.  My differential is interstitial cystitis versus endometriosis.  Recommended she try scheduled ibuprofen the next few days.  Recommended she keep a log of what foods or situations seem to set off her pain as well as what relieves it.  Depending on the results will likely send to urology versus gynecology.  -     Urinalysis w/microscopic, reflex culture Urine, Clean Catch; Future  -     H. pylori antibody, IgG Blood, Venous; Future  -     Urinalysis, dipstick Urine, Clean Catch  -     Urinalysis, microscopic Urine, Clean Catch      ONIEL BURGOS MD

## 2018-06-20 ENCOUNTER — OFFICE VISIT (OUTPATIENT)
Dept: FAMILY MEDICINE | Facility: CLINIC | Age: 33
End: 2018-06-20
Payer: COMMERCIAL

## 2018-06-20 VITALS
DIASTOLIC BLOOD PRESSURE: 58 MMHG | WEIGHT: 160 LBS | BODY MASS INDEX: 31.25 KG/M2 | HEART RATE: 79 BPM | RESPIRATION RATE: 18 BRPM | TEMPERATURE: 97 F | SYSTOLIC BLOOD PRESSURE: 108 MMHG | OXYGEN SATURATION: 98 %

## 2018-06-20 DIAGNOSIS — Z30.09 CONTRACEPTIVE USE EDUCATION: Primary | ICD-10-CM

## 2018-06-20 PROCEDURE — 99212 OFFICE O/P EST SF 10 MIN: CPT | Performed by: FAMILY MEDICINE

## 2018-06-20 RX ORDER — NORETHINDRONE 0.35 MG/1
1 TABLET ORAL DAILY
Qty: 28 TABLET | Refills: 12 | Status: SHIPPED | OUTPATIENT
Start: 2018-06-20 | End: 2019-06-20

## 2018-06-20 ASSESSMENT — PAIN SCALES - GENERAL: PAINLEVEL: 0-NO PAIN

## 2018-06-20 NOTE — PROGRESS NOTES
Subjective      Kenya Lopez is a 32 y.o. female who presents for f/u abd pain, starting oral contraceptives.    HPI   Kenya is a 32-year-old female who has had intermittent abdominal pain as of late.  See previous note.  She had an IUD that was potentially in the myometrium that we removed her in the office.  Pain did not really resolve with removing this however.    Pain is better.  Is avoiding lactose and seems to help (pizza, fatty foods seem to set it off).      UA not c/w hemorrhagic cystitis on last check    Now that the abdominal pain has improved she is here to discuss birth control options -as above she previously had an IUD and did well with this.  Unfortunately it was potentially misplaced, because of abdominal pain suspected culprit at the time.    She is hesitant to do Depo shots.  She smokes on occasion.  She has a history of tetralogy of flow.  No history of DVT or PE.    The following have been reviewed and updated as appropriate in this visit:  No text in SmartText        Not on File  Prior to Admission medications    Not on File      Past Medical History:   Diagnosis Date   • Fallot tetralogy    • Ventricular septal defect      Past Surgical History:   Procedure Laterality Date   • CARDIAC SURGERY     • TONSILLECTOMY       History reviewed. No pertinent family history.  Social History     Social History   • Marital status: Single     Spouse name: N/A   • Number of children: N/A   • Years of education: N/A     Social History Main Topics   • Smoking status: Current Every Day Smoker     Packs/day: 0.50     Types: Cigarettes   • Smokeless tobacco: Never Used   • Alcohol use Yes      Comment: rarely   • Drug use: No   • Sexual activity: Defer     Other Topics Concern   • None     Social History Narrative   • None       10 point ROS negative except as in HPI    Objective     Vital Signs:  /58 (BP Location: Left arm, Patient Position: Sitting, Cuff Size: Large)   Pulse 79   Temp 36.1 °C (97  °F) (Temporal)   Resp 18   Wt 72.6 kg (160 lb)   SpO2 98%   BMI 31.25 kg/m²     Labs:  Results for orders placed or performed in visit on 06/13/18   Urinalysis, dipstick Urine, Clean Catch   Result Value Ref Range    Color, Urine Yellow Yellow    Clarity, Urine Clear Clear    pH, Urine 6.5 5.0 - 8.0 PH    Specific Gravity, Urine <=1.005 1.003 - 1.030    Protein, Urine Negative Negative mg/dL    Glucose, Urine Negative Negative mg/dL    Ketones, Urine Negative Negative mg/dL    Blood, Urine Small (A) Negative    Nitrite, Urine Negative Negative    Bilirubin, Urine Negative Negative    Leukocytes, Urine Negative Negative    Urobilinogen, Urine 0.2 <2.0 E.U./dL   Urinalysis, microscopic Urine, Clean Catch   Result Value Ref Range    RBC, Urine 0-4 None seen, 0-4, Negative /HPF    WBC, Urine 0-4 0 - 4 /HPF    Squamous Epithelial, Urine None seen None Seen-9 /HPF    Bacteria, Urine None seen None seen, Few /HPF       Physical Exam:  GENERAL: No acute distress, average body habitus, no foul or apparent body odors  HEENT: Head normocephalic atraumatic. Moist mucous membranes. Nares clear without crusts or sores. Oropharynx without erythema or exudates. Pupils are round equally reactive to light. Sclera nonicteric. Conjunctiva noninjected.  CARDIOVASCULAR: Regular rate and rhythm with normal S1 and S2 and without appreciable murmurs.  Pulses present and equal bilaterally.   PULMONARY: Lungs are clear to auscultation bilaterally with normal work of breathing.  No wheezes or crackles noted.  ABDOMEN: Soft, nondistended, nontender. Bowel sounds present and normoactive.  NEUROLOGIC: No focal deficits, CN II-XII grossly intact  PSYCHIATRIC: Alert, calm, cooperative, friendly to examiner  SKIN: Without rashes or bruising  EXTREMITIES: No edema    Assessment/Plan   Diagnoses and all orders for this visit:    Contraceptive use education  32-year-old female presents to discuss birth control options.  She is a high-risk candidate  for combined contraceptives with her history of tetralogy of flow, active smoking, age nearing 34.  I believe we would be safest with the progesterone only medication.  She is aware that she needs to take this on a regularly scheduled basis, recommended she put an alarm in her phone.  Should she miss a dose she should utilize backup contraceptive for about a week.  She has been abstinent since we took her IUD, she is currently on her period.  -     norethindrone (JENCYCLA) 0.35 mg tablet; Take 1 tablet (0.35 mg total) by mouth daily.    ONIEL BURGOS MD

## 2019-03-13 ENCOUNTER — TELEPHONE (OUTPATIENT)
Dept: CARDIOLOGY | Facility: CLINIC | Age: 34
End: 2019-03-13

## 2019-03-13 DIAGNOSIS — Q21.3 TOF (TETRALOGY OF FALLOT): Primary | ICD-10-CM

## 2019-03-13 NOTE — TELEPHONE ENCOUNTER
New [facility]/ [facility] Patient Intake    1.  Patient's cardiac history: TOF    2.  Previous cardiac procedures (heart catherizations, surgeries): 3 major open heart surgeries, [place] - November 2013 - [place], [place] (pulmonary valve replacement), [place] - 1984 TOF repair, 1997 VSD repair    3.  Patient's previous cardiologist and when last visit was: Dr. Jakob Angel ([place])    4.  Recent testing (Echo, MRI, CT, Stress tests): No    5.  Any present concerns: weakness, frequent palpitations, dizzy spells    6.  Closet location for patient to be seen ([facility]): [place]     7.  Any recent testing at the [facility]: No    8.  Patient's E mail or Fax number to send VAHID: [email or web address]        FUTURE VISIT INFORMATION        FUTURE VISIT INFORMATION:    Date:     Time:     Location:      RECORDS REQUESTED FROM:         Clinic name Comments Records Status Imaging Status                                                          RECORDS STATUS

## 2019-03-22 NOTE — TELEPHONE ENCOUNTER
Date: 3/22/2019    Time of Call: 3:54 PM     Diagnosis:  TOF     [ TORB ] Ordering provider: SUKHDEV Protocol   Order: cMRI/MRA, fasting labs, EKG and follow up     Order received by: Jarad Irene RN     Follow-up/additional notes: None.

## 2019-05-31 PROBLEM — Q21.3 TETRALOGY OF FALLOT: Status: ACTIVE | Noted: 2019-05-31

## 2019-05-31 PROBLEM — Q24.9 CONGENITAL ANOMALIES OF THE HEART: Status: ACTIVE | Noted: 2019-05-31

## 2019-05-31 PROBLEM — Z95.2 S/P PULMONARY VALVE REPLACEMENT: Status: ACTIVE | Noted: 2019-05-31

## 2019-05-31 LAB
ABO GROUP BLD: NORMAL
D AG BLD QL: POSITIVE
EXTERNAL ANTIBODY SCREEN: NEGATIVE
EXTERNAL HEPATITIS B SURFACE ANTIGEN: NONREACTIVE
EXTERNAL HIV 1+2 AB/P24 AG SERUM: NONREACTIVE
EXTERNAL NEISSERIA GONORRHOEAE DNA PROBE: NEGATIVE
EXTERNAL RUBELLA ANTIBODY, IGG: NORMAL

## 2019-06-05 ENCOUNTER — HOSPITAL ENCOUNTER (OUTPATIENT)
Dept: CARDIOLOGY | Facility: HOSPITAL | Age: 34
End: 2019-06-05
Attending: OBSTETRICS & GYNECOLOGY
Payer: COMMERCIAL

## 2019-06-12 ENCOUNTER — ANCILLARY PROCEDURE (OUTPATIENT)
Dept: CARDIOLOGY | Facility: CLINIC | Age: 34
End: 2019-06-12
Payer: COMMERCIAL

## 2019-06-12 DIAGNOSIS — Q21.3 TETRALOGY OF FALLOT: ICD-10-CM

## 2019-06-12 LAB
ASCENDING AORTA: 2.45 CM
AV LVOT PEAK GRADIENT: 4.2 MMHG
AV MEAN GRADIENT: 3.91 MMHG
AV PEAK GRADIENT: 6.55 MMHG
DOP CALC AO PEAK VEL: 1.28 M/S
DOP CALC AO VTI: 29 CM
DOP CALC LVOT DIAMETER: 1.76 CM
DOP CALC LVOT STROKE VOLUME: 54 CM3
DOP CALC MV VTI: 27.15 CM
DOP CALC RVOT PEAK VEL: 0.9 M/S
DOP CALC RVOT VTI: 19.2 CM
DOP CALCLVOT PEAK VEL VTI: 22.1 CM
E/A RATIO: 1.1
E/E' RATIO (AVERAGE): 7.7
E/E' RATIO: 9.2
EJECTION FRACTION: 48 %
ERAP: 5 MMHG
INTERVENTRICULAR SEPTUM: 0.9 CM (ref 0.6–1.1)
LA AREA A4C SYSTOLE: 22 CM3
LA AREA A4C SYSTOLE: 22 CM3
LEFT ATRIUM SIZE: 3.55 CM
LEFT ATRIUM VOLUME INDEX: 16 ML/M2
LEFT ATRIUM VOLUME: 26.4 CM3
LEFT INTERNAL DIMENSION IN SYSTOLE: 3.3 CM (ref 2.1–4)
LEFT VENTRICLE DIASTOLIC VOLUME: 65 CM3
LEFT VENTRICLE SYSTOLIC VOLUME: 34 CM3
LEFT VENTRICULAR INTERNAL DIMENSION IN DIASTOLE: 4.6 CM (ref 3.5–6)
LVAD-AP2: 20.4 CM2
MV DT: 235 MS
MV MEAN GRADIENT: 1.8 MMHG
MV PEAK A VEL: 72 CM/S
MV PEAK E VEL: 76.6 CM/S
MV PEAK GRADIENT: 4 MMHG
MV VMAX: 100 CM/S
MVA (VTI): 1.98 CM2
POSTERIOR WALL: 1.1 CM (ref 0.6–1.1)
PV MEAN GRADIENT: 2.27 MMHG
PV PEAK GRADIENT: 3.61 MMHG
PV PEAK S VEL: 50 CM/S
PV VMAX: 0.95 M/S
PVA VTI: 19.3 CM
RA AREA: 11.2 CM2
RH CV ECHO AV VALVE AREA VEL: 2 CM2
RH CV ECHO AV VALVE AREA VTI: 1.9 CM2
RH LVOT PEAK VELOCITY REST: 1 M/S
RIGHT VENTRICULAR INTERNAL DIMENSION IN DIASTOLE: 3.6 CM
RV AP4 BASE: 4.3 CM
TDI: 8.3 CM/S
TDILATERAL: 12.6 CM/S
TR MAX PG: 20.07 MMHG
TRICUSPID ANNULAR PLANE SYSTOLIC EXCURSION: 1.1 CM
TRICUSPID VALVE PEAK REGURGITATION VELOCITY: 2.24 M/S
TV REST PULMONARY ARTERY PRESSURE: 25 MMHG

## 2019-06-12 PROCEDURE — 93306 TTE W/DOPPLER COMPLETE: CPT | Performed by: INTERNAL MEDICINE

## 2019-06-27 LAB — EXTERNAL CHLAMYDIA TRACHOMATIS DNA PROBE: NEGATIVE

## 2019-08-26 ENCOUNTER — HOSPITAL ENCOUNTER (EMERGENCY)
Facility: HOSPITAL | Age: 34
Discharge: HOME | End: 2019-08-26
Attending: EMERGENCY MEDICINE
Payer: COMMERCIAL

## 2019-08-26 ENCOUNTER — APPOINTMENT (OUTPATIENT)
Dept: RADIOLOGY | Facility: HOSPITAL | Age: 34
End: 2019-08-26
Payer: COMMERCIAL

## 2019-08-26 VITALS
OXYGEN SATURATION: 96 % | SYSTOLIC BLOOD PRESSURE: 98 MMHG | BODY MASS INDEX: 31.25 KG/M2 | HEART RATE: 67 BPM | DIASTOLIC BLOOD PRESSURE: 63 MMHG | TEMPERATURE: 99.1 F | RESPIRATION RATE: 18 BRPM | WEIGHT: 160 LBS

## 2019-08-26 DIAGNOSIS — R07.9 CHEST PAIN: Primary | ICD-10-CM

## 2019-08-26 LAB
ALBUMIN SERPL-MCNC: 3.4 G/DL (ref 3.5–5.3)
ALP SERPL-CCNC: 69 U/L (ref 37–98)
ALT SERPL-CCNC: 21 U/L (ref 0–52)
ANION GAP SERPL CALC-SCNC: 10 MMOL/L (ref 3–11)
AST SERPL-CCNC: 18 U/L (ref 0–39)
BILIRUB SERPL-MCNC: 0.27 MG/DL (ref 0–1.4)
BUN SERPL-MCNC: 4 MG/DL (ref 7–25)
CALCIUM ALBUM COR SERPL-MCNC: 9.1 MG/DL (ref 8.6–10.3)
CALCIUM SERPL-MCNC: 8.6 MG/DL (ref 8.6–10.3)
CHLORIDE SERPL-SCNC: 108 MMOL/L (ref 98–107)
CO2 SERPL-SCNC: 20 MMOL/L (ref 21–32)
CREAT SERPL-MCNC: 0.38 MG/DL (ref 0.6–1.2)
EOSINOPHIL # BLD MANUAL: 0.4 10*3/UL
EOSINOPHIL NFR BLD MANUAL: 3 % (ref 0–3)
ERYTHROCYTE [DISTWIDTH] IN BLOOD BY AUTOMATED COUNT: 13.6 % (ref 11.5–14)
GFR SERPL CREATININE-BSD FRML MDRD: 137 ML/MIN/1.73M*2
GLUCOSE SERPL-MCNC: 86 MG/DL (ref 70–105)
HCT VFR BLD AUTO: 36.7 % (ref 34–45)
HGB BLD-MCNC: 12.8 G/DL (ref 11.5–15.5)
LYMPHOCYTES # BLD MANUAL: 2.2 10*3/UL
LYMPHOCYTES NFR BLD MANUAL: 15 % (ref 11–47)
MCH RBC QN AUTO: 31.9 PG (ref 28–33)
MCHC RBC AUTO-ENTMCNC: 34.9 G/DL (ref 32–36)
MCV RBC AUTO: 91.2 FL (ref 81–97)
MONOCYTES # BLD MANUAL: 0.6 10*3/UL
MONOCYTES NFR BLD MANUAL: 4 % (ref 3–11)
NEUTROPHILS # BLD MANUAL: 11.17 10*3/UL
NEUTS SEG # BLD MANUAL: 11.2 10*3/UL
NEUTS SEG NFR BLD MANUAL: 76 % (ref 41–81)
PLATELET # BLD AUTO: 289 10*3/UL (ref 140–350)
PLATELET # BLD EST: ADEQUATE 10*3/UL
PMV BLD AUTO: 7.4 FL (ref 6.9–10.8)
POTASSIUM SERPL-SCNC: 3.6 MMOL/L (ref 3.5–5.1)
PROT SERPL-MCNC: 6.2 G/DL (ref 6–8.3)
RBC # BLD AUTO: 4.03 10*6/ΜL (ref 3.7–5.3)
SODIUM SERPL-SCNC: 138 MMOL/L (ref 135–145)
TOTAL CELLS COUNTED BLD: 100 CELLS
TROPONIN I SERPL-MCNC: <0.03 NG/ML
VARIANT LYMPHS # BLD MANUAL: 0.3 10*3/UL
VARIANT LYMPHS NFR BLD: 2 % (ref 0–1)
WBC # BLD AUTO: 14.7 10*3/UL (ref 4.5–10.5)
WBC NRBC COR # BLD: 14.7 10*3/UL

## 2019-08-26 PROCEDURE — 80053 COMPREHEN METABOLIC PANEL: CPT | Performed by: EMERGENCY MEDICINE

## 2019-08-26 PROCEDURE — 36415 COLL VENOUS BLD VENIPUNCTURE: CPT | Performed by: EMERGENCY MEDICINE

## 2019-08-26 PROCEDURE — 85025 COMPLETE CBC W/AUTO DIFF WBC: CPT | Performed by: EMERGENCY MEDICINE

## 2019-08-26 PROCEDURE — 99283 EMERGENCY DEPT VISIT LOW MDM: CPT | Performed by: EMERGENCY MEDICINE

## 2019-08-26 PROCEDURE — 71045 X-RAY EXAM CHEST 1 VIEW: CPT

## 2019-08-26 PROCEDURE — 84484 ASSAY OF TROPONIN QUANT: CPT | Performed by: EMERGENCY MEDICINE

## 2019-08-26 PROCEDURE — 93005 ELECTROCARDIOGRAM TRACING: CPT

## 2019-08-26 RX ORDER — HYDROCODONE BITARTRATE AND ACETAMINOPHEN 5; 325 MG/1; MG/1
1 TABLET ORAL ONCE
Status: COMPLETED | OUTPATIENT
Start: 2019-08-26 | End: 2019-08-26

## 2019-08-26 RX ADMIN — HYDROCODONE BITARTRATE AND ACETAMINOPHEN 1 PACKAGE: 5; 325 TABLET ORAL at 02:59

## 2019-08-26 ASSESSMENT — HEART SCORE
RISK FACTORS: 1-2 RISK FACTORS
HEART SCORE: 2
TROPONIN: LESS THAN OR EQUAL TO NORMAL LIMIT
AGE: <45
HISTORY: SLIGHTLY SUSPICIOUS
ECG: NON-SPECIFIC REPOLARIZATION DISTURBANCE

## 2019-08-26 NOTE — DISCHARGE INSTRUCTIONS
Your x-ray EKG and troponin are normal.  I find no evidence of blocked artery at this time.  He will need to follow-up closely with your primary care provider.    Take hydrocodone 1 tablet every 6 hours as needed for pain.

## 2019-08-26 NOTE — ED PROVIDER NOTES
Chest Pain (VIA POV FOR CP AND SOB X 2 WEEKS BUT WORSENING OVER PAST 2 WEEKS. 24 WEEKS PREGNANT- PT DENIES CRAMPING BUT STATES  FETAL MOVEMENT AND VOMITING. HX OF TETROLOGY OF FALLOT.) and Shortness of Breath        HPI:    Patient is a 34 y.o. female who presents with episodes of chest pain some shortness of breath over the last 2 weeks.  She is 24 weeks pregnant.  She has had some vomiting as well.  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    Past Medical History:   Diagnosis Date   • Fallot tetralogy    • Ventricular septal defect        Past Surgical History:   Procedure Laterality Date   • CARDIAC SURGERY     • TONSILLECTOMY         Social History     Socioeconomic History   • Marital status: Single     Spouse name: Not on file   • Number of children: Not on file   • Years of education: Not on file   • Highest education level: Not on file   Occupational History   • Not on file   Social Needs   • Financial resource strain: Not on file   • Food insecurity:     Worry: Not on file     Inability: Not on file   • Transportation needs:     Medical: Not on file     Non-medical: Not on file   Tobacco Use   • Smoking status: Current Every Day Smoker     Packs/day: 0.50     Types: Cigarettes   • Smokeless tobacco: Never Used   Substance and Sexual Activity   • Alcohol use: Not Currently     Comment: rarely   • Drug use: No   • Sexual activity: Defer   Lifestyle   • Physical activity:     Days per week: Not on file     Minutes per session: Not on file   • Stress: Not on file   Relationships   • Social connections:     Talks on phone: Not on file     Gets together: Not on file     Attends Restorationism service: Not on file     Active member of club or organization: Not on file     Attends meetings of clubs or organizations: Not on file     Relationship status: Not on file   • Intimate partner violence:     Fear of current or ex partner: Not on file     Emotionally abused: Not on file     Physically  abused: Not on file     Forced sexual activity: Not on file   Other Topics Concern   • Not on file   Social History Narrative   • Not on file       History reviewed. No pertinent family history.    No Known Allergies      Current Outpatient Medications:   •  PNV no.95/ferrous fum/folic ac (PRENATAL ORAL), Take by mouth, Disp: , Rfl:   •  ranitidine HCl (ZANTAC ORAL), Take by mouth, Disp: , Rfl:   •  METOPROLOL SUCCINATE ORAL, Take by mouth, Disp: , Rfl:       ROS:  Constitutional: negative for fever  Eyes: negative for eye pain  ENT: negative for sore throat  Cardiovascular: Positive for chest pain  Respiratory: negative for hemoptysis  GI: negative for abdominal pain  : negative for hematuria  Musculoskeletal: negative for back pain  Neuro: negative for headache  Hematology: negative for bleeding  Immunology: negative for joint pain      ED Triage Vitals   Temp Heart Rate Resp BP SpO2   08/26/19 0023 08/26/19 0023 08/26/19 0023 08/26/19 0023 08/26/19 0023   37.3 °C (99.1 °F) 78 18 91/55 100 %      Temp Source Heart Rate Source Patient Position BP Location FiO2 (%)   08/26/19 0023 -- 08/26/19 0219 -- --   Oral  Sitting           Physical Exam:  Nursing note and vitals reviewed.  Constitutional: appears well-developed.   HENT:   Head: Normocephalic and atraumatic.   Eyes: Pupils are equal, round, and reactive to light.   Neck: Supple, no lymphadenopathy  Cardiovascular: Regular rate and rhythm with no murmur, rub, or gallop.  Normal pulses.  Loud S1-S2  Pulmonary/Chest: No respiratory distress.  Clear to auscultation bilaterally.  Abdominal: Soft and nontender.    Back: No CVA tenderness.  Musculoskeletal: No edema  Neurological: Alert.   Skin: Skin is warm and dry. No rash noted.   Psychiatric: Normal mood and affect.           Labs:  Labs Reviewed   CBC WITH AUTO DIFFERENTIAL - Abnormal       Result Value    WBC 14.7 (*)     RBC 4.03      Hemoglobin 12.8      Hematocrit 36.7      MCV 91.2      MCH 31.9      MCHC  34.9      RDW 13.6      Platelets 289      MPV 7.4     COMPREHENSIVE METABOLIC PANEL - Abnormal    Sodium 138      Potassium 3.6      Chloride 108 (*)     CO2 20 (*)     Anion Gap 10      BUN 4 (*)     Creatinine 0.38 (*)     Glucose 86      Calcium 8.6      AST 18      ALT (SGPT) 21      Alkaline Phosphatase 69      Total Protein 6.2      Albumin 3.4 (*)     Total Bilirubin 0.27      eGFR 137      Corrected Calcium 9.1      Narrative:     Estimated GFR calculated using the 2009 CKD-EPI creatinine equation.   MANUAL DIFFERENTIAL - Abnormal    WBC 14.70      Neutrophils% 76      Lymphocytes% 15      Atypical Lymphocytes% 2 (*)     Monocytes% 4      Eosinophils% 3      Absolute Neutrophil Count 11.172      Segs Absolute 11.2      Lymphocytes Absolute 2.2      Atypical Lymphs Absolute 0.3      Monocytes Absolute 0.6      Eosinophils Absolute 0.4      Total Counted 100      Platelet Estimate Adequate     TROPONIN I - Normal    Troponin I <0.030           Imaging:  XR chest portable 1 view    (Results Pending)             MDM:    She is hemodynamically stable.  There is no evidence of ACS.  Her chest x-ray shows postoperative changes.  There is no pneumothorax.  No evidence of pulmonary embolism.  The patient will be treated symptomatically and asked to follow-up closely with her primary care provider.      Given Medications - No data to display         HEART Score: 2      ECG 12 lead - Chest pain  Date/Time: 8/26/2019 1:10 AM  Performed by: Kaila Kidd MD  Authorized by: Kaila Kidd MD     Comments:      Sinus rhythm, right bundle branch block            Clinical Impression:  Final diagnoses:   [R07.9] Chest pain           A voice recognition program was used to aid in documentation of this record.  Sometimes words are not printed exactly as they were spoken.  While efforts were made to carefully edit and correct any inaccuracies, some errors may be present; please take these into context.  Please contact the  provider if areas are identified.         Kaila Kidd MD  08/26/19 3609

## 2019-09-29 ENCOUNTER — HOSPITAL ENCOUNTER (OUTPATIENT)
Facility: HOSPITAL | Age: 34
Discharge: HOME | End: 2019-09-29
Attending: OBSTETRICS & GYNECOLOGY | Admitting: OBSTETRICS & GYNECOLOGY
Payer: COMMERCIAL

## 2019-09-29 ENCOUNTER — APPOINTMENT (OUTPATIENT)
Dept: ULTRASOUND IMAGING | Facility: HOSPITAL | Age: 34
End: 2019-09-29
Attending: FAMILY MEDICINE
Payer: COMMERCIAL

## 2019-09-29 ENCOUNTER — APPOINTMENT (OUTPATIENT)
Dept: RADIOLOGY | Facility: HOSPITAL | Age: 34
End: 2019-09-29
Attending: OBSTETRICS & GYNECOLOGY
Payer: COMMERCIAL

## 2019-09-29 ENCOUNTER — APPOINTMENT (OUTPATIENT)
Dept: CARDIOLOGY | Facility: HOSPITAL | Age: 34
End: 2019-09-29
Attending: FAMILY MEDICINE
Payer: COMMERCIAL

## 2019-09-29 VITALS
BODY MASS INDEX: 32.2 KG/M2 | HEART RATE: 84 BPM | RESPIRATION RATE: 17 BRPM | SYSTOLIC BLOOD PRESSURE: 106 MMHG | HEIGHT: 60 IN | WEIGHT: 164.02 LBS | DIASTOLIC BLOOD PRESSURE: 51 MMHG

## 2019-09-29 LAB
ASCENDING AORTA: 2.4 CM
AV LVOT PEAK GRADIENT: 5 MMHG
AV MEAN GRADIENT: 6 MMHG
AV PEAK GRADIENT: 9 MMHG
BSA FOR ECHO PROCEDURE: 1.77 M2
DOP CALC AO PEAK VEL: 1.5 M/S
DOP CALC AO VTI: 27.2 CM
DOP CALC LVOT DIAMETER: 2.1 CM
DOP CALC LVOT STROKE VOLUME: 71 CM3
DOP CALC RVOT DIAMETER: 2.9 CM
DOP CALC RVOT PEAK VEL: 0.8 M/S
DOP CALC RVOT VTI: 14 CM
DOP CALCLVOT PEAK VEL VTI: 20.5 CM
E/A RATIO: 0.7
E/E' RATIO (AVERAGE): 8.5
E/E' RATIO: 11.4
EJECTION FRACTION: 56 %
ERAP: 5 MMHG
ERYTHROCYTE [DISTWIDTH] IN BLOOD BY AUTOMATED COUNT: 13.4 % (ref 11.5–14)
FIBRIN D-DIMER (NG/ML) IN PLATELET POOR PLASMA: 769 NG/ML
HCT VFR BLD AUTO: 37.1 % (ref 34–45)
HGB BLD-MCNC: 13 G/DL (ref 11.5–15.5)
INTERVENTRICULAR SEPTUM: 0.7 CM (ref 0.6–1.1)
LA AREA A4C SYSTOLE: 32.2 CM3
LA AREA A4C SYSTOLE: 32.2 CM3
LEFT ATRIUM SIZE: 3.4 CM
LEFT ATRIUM VOLUME INDEX: 21 ML/M2
LEFT ATRIUM VOLUME: 36.1 CM3
LEFT INTERNAL DIMENSION IN SYSTOLE: 3.2 CM (ref 2.1–4)
LEFT VENTRICLE DIASTOLIC VOLUME: 75 CM3
LEFT VENTRICLE SYSTOLIC VOLUME: 31 ML
LEFT VENTRICULAR INTERNAL DIMENSION IN DIASTOLE: 4.5 CM (ref 3.5–6)
LVAD-AP2: 23.4 CM2
MCH RBC QN AUTO: 31.7 PG (ref 28–33)
MCHC RBC AUTO-ENTMCNC: 35 G/DL (ref 32–36)
MCV RBC AUTO: 90.5 FL (ref 81–97)
MV DT: 172 MS
MV PEAK A VEL: 86.3 CM/S
MV PEAK E VEL: 60.2 CM/S
PLATELET # BLD AUTO: 323 10*3/UL (ref 140–350)
PMV BLD AUTO: 7.3 FL (ref 6.9–10.8)
POSTERIOR WALL: 1 CM (ref 0.6–1.1)
PULM VEIN S/D RATIO: 0.7
PV MEAN GRADIENT: 9 MMHG
PV PEAK D VEL: 53.7 CM/S
PV PEAK GRADIENT: 18 MMHG
PV PEAK S VEL: 39.9 CM/S
PV VALVE AREA: 2.3 CM2
PV VMAX: 2.05 M/S
PV VTI: 40 CM
PVA VTI: 40.3 CM
RA AREA: 18.8 CM2
RBC # BLD AUTO: 4.1 10*6/ΜL (ref 3.7–5.3)
RH CV ECHO AV VALVE AREA VEL: 2.7 CM2
RH CV ECHO AV VALVE AREA VTI: 2.6 CM2
RH LVOT PEAK VELOCITY REST: 1.2 M/S
RIGHT VENTRICULAR INTERNAL DIMENSION IN DIASTOLE: 3.2 CM
RV AP4 BASE: 4.1 CM
S': 8.2 CM/S
TDI: 5.3 CM/S
TDILATERAL: 10.7 CM/S
TR MAX PG: 24 MMHG
TRICUSPID ANNULAR PLANE SYSTOLIC EXCURSION: 1.3 CM
TRICUSPID VALVE PEAK REGURGITATION VELOCITY: 2.43 M/S
TROPONIN I SERPL-MCNC: <0.03 NG/ML
TV REST PULMONARY ARTERY PRESSURE: 29 MMHG
WBC # BLD AUTO: 11.9 10*3/UL (ref 4.5–10.5)
Z-SCORE OF LEFT VENTRICULAR DIMENSION IN END DIASTOLE: -0.56
Z-SCORE OF LEFT VENTRICULAR DIMENSION IN END SYSTOLE: 0.65

## 2019-09-29 PROCEDURE — 36415 COLL VENOUS BLD VENIPUNCTURE: CPT | Performed by: OBSTETRICS & GYNECOLOGY

## 2019-09-29 PROCEDURE — 93306 TTE W/DOPPLER COMPLETE: CPT

## 2019-09-29 PROCEDURE — 93970 EXTREMITY STUDY: CPT

## 2019-09-29 PROCEDURE — (BLANK) HC ROOM SEMI PRIVATE OBSTETRICS

## 2019-09-29 PROCEDURE — 6370000100 HC RX 637 (ALT 250 FOR IP): Performed by: STUDENT IN AN ORGANIZED HEALTH CARE EDUCATION/TRAINING PROGRAM

## 2019-09-29 PROCEDURE — 2500000200 HC RX 250 WO HCPCS: Performed by: OBSTETRICS & GYNECOLOGY

## 2019-09-29 PROCEDURE — 85379 FIBRIN DEGRADATION QUANT: CPT | Performed by: OBSTETRICS & GYNECOLOGY

## 2019-09-29 PROCEDURE — 93010 ELECTROCARDIOGRAM REPORT: CPT | Performed by: INTERNAL MEDICINE

## 2019-09-29 PROCEDURE — 2590000100 HC RX 259: Performed by: OBSTETRICS & GYNECOLOGY

## 2019-09-29 PROCEDURE — 85027 COMPLETE CBC AUTOMATED: CPT | Performed by: OBSTETRICS & GYNECOLOGY

## 2019-09-29 PROCEDURE — 76705 ECHO EXAM OF ABDOMEN: CPT

## 2019-09-29 PROCEDURE — 94640 AIRWAY INHALATION TREATMENT: CPT

## 2019-09-29 PROCEDURE — 99214 OFFICE O/P EST MOD 30 MIN: CPT | Performed by: FAMILY MEDICINE

## 2019-09-29 PROCEDURE — 93306 TTE W/DOPPLER COMPLETE: CPT | Mod: 26 | Performed by: INTERNAL MEDICINE

## 2019-09-29 PROCEDURE — 71046 X-RAY EXAM CHEST 2 VIEWS: CPT

## 2019-09-29 PROCEDURE — G0463 HOSPITAL OUTPT CLINIC VISIT: HCPCS

## 2019-09-29 PROCEDURE — 84484 ASSAY OF TROPONIN QUANT: CPT | Performed by: OBSTETRICS & GYNECOLOGY

## 2019-09-29 PROCEDURE — 93005 ELECTROCARDIOGRAM TRACING: CPT | Performed by: OBSTETRICS & GYNECOLOGY

## 2019-09-29 PROCEDURE — 6370000100 HC RX 637 (ALT 250 FOR IP): Performed by: FAMILY MEDICINE

## 2019-09-29 RX ORDER — ONDANSETRON HYDROCHLORIDE 2 MG/ML
4 INJECTION, SOLUTION INTRAVENOUS EVERY 6 HOURS PRN
Status: DISCONTINUED | OUTPATIENT
Start: 2019-09-29 | End: 2019-09-29

## 2019-09-29 RX ORDER — GUAIFENESIN 600 MG/1
1200 TABLET, EXTENDED RELEASE ORAL 2 TIMES DAILY
COMMUNITY
End: 2020-02-01 | Stop reason: ALTCHOICE

## 2019-09-29 RX ORDER — LIDOCAINE 560 MG/1
1 PATCH PERCUTANEOUS; TOPICAL; TRANSDERMAL ONCE
Status: DISCONTINUED | OUTPATIENT
Start: 2019-09-29 | End: 2019-09-30 | Stop reason: HOSPADM

## 2019-09-29 RX ORDER — IPRATROPIUM BROMIDE AND ALBUTEROL SULFATE 2.5; .5 MG/3ML; MG/3ML
3 SOLUTION RESPIRATORY (INHALATION) ONCE
Status: COMPLETED | OUTPATIENT
Start: 2019-09-29 | End: 2019-09-29

## 2019-09-29 RX ORDER — FENTANYL CITRATE/PF 50 MCG/ML
50 PLASTIC BAG, INJECTION (ML) INTRAVENOUS
Status: DISCONTINUED | OUTPATIENT
Start: 2019-09-29 | End: 2019-09-29

## 2019-09-29 RX ORDER — FENTANYL CITRATE/PF 50 MCG/ML
100 PLASTIC BAG, INJECTION (ML) INTRAVENOUS
Status: DISCONTINUED | OUTPATIENT
Start: 2019-09-29 | End: 2019-09-29

## 2019-09-29 RX ORDER — LIDOCAINE HYDROCHLORIDE 10 MG/ML
30 INJECTION, SOLUTION EPIDURAL; INFILTRATION; INTRACAUDAL; PERINEURAL ONCE
Status: DISCONTINUED | OUTPATIENT
Start: 2019-09-29 | End: 2019-09-29

## 2019-09-29 RX ORDER — LANSOPRAZOLE 30 MG/1
30 CAPSULE, DELAYED RELEASE ORAL ONCE
Status: COMPLETED | OUTPATIENT
Start: 2019-09-29 | End: 2019-09-29

## 2019-09-29 RX ORDER — LIDOCAINE 50 MG/G
1 PATCH TOPICAL DAILY
Qty: 30 PATCH | Refills: 0 | OUTPATIENT
Start: 2019-09-29 | End: 2019-10-29

## 2019-09-29 RX ORDER — CALCIUM CARBONATE 200(500)MG
500 TABLET,CHEWABLE ORAL
Status: DISCONTINUED | OUTPATIENT
Start: 2019-09-29 | End: 2019-09-29

## 2019-09-29 RX ORDER — ACETAMINOPHEN 500 MG
500 TABLET ORAL ONCE
Status: COMPLETED | OUTPATIENT
Start: 2019-09-29 | End: 2019-09-29

## 2019-09-29 RX ORDER — ACETAMINOPHEN 500 MG
500-1000 TABLET ORAL EVERY 6 HOURS PRN
Status: DISCONTINUED | OUTPATIENT
Start: 2019-09-29 | End: 2019-09-29

## 2019-09-29 RX ADMIN — LIDOCAINE HYDROCHLORIDE 45 ML: 20 SOLUTION ORAL; TOPICAL at 17:00

## 2019-09-29 RX ADMIN — IPRATROPIUM BROMIDE AND ALBUTEROL SULFATE 3 ML: .5; 3 SOLUTION RESPIRATORY (INHALATION) at 15:15

## 2019-09-29 RX ADMIN — LANSOPRAZOLE 30 MG: 30 CAPSULE, DELAYED RELEASE PELLETS ORAL at 20:45

## 2019-09-29 RX ADMIN — Medication 500 MG: at 20:25

## 2019-09-29 RX ADMIN — LIDOCAINE 1 PATCH: 560 PATCH PERCUTANEOUS; TOPICAL; TRANSDERMAL at 19:00

## 2019-09-29 RX ADMIN — IPRATROPIUM BROMIDE AND ALBUTEROL SULFATE 3 ML: .5; 3 SOLUTION RESPIRATORY (INHALATION) at 20:15

## 2019-09-29 NOTE — H&P
"History and Physical-OB    19  Patient name: Kenya Lopez    Chief complaint: Cough, right chest pain    HPI  Kenya Lopez is a 34 y.o.   Estimated Date of Delivery: 19  with EGA 29w2d who presents with a \"bronchitis\" diagnosed at urgent care with a cough and right-sided chest pain.+FM. Her pregnancy is complicated by a maternal corrected Tetrology of Fallot, AMA, tobacco, and a fetal double nuchal cord. Her URI started on Monday and has gotten worse, and she rates her chest pain 7/10. She was prescribed a Zpack.    OB History    Para Term  AB Living   5 3 2 1 1 3   SAB TAB Ectopic Molar Multiple Live Births   1 0 0 0 0 3      # Outcome Date GA Lbr Haroldo/2nd Weight Sex Delivery Anes PTL Lv   5 Current            4 SAB 14           3 Term 05    M Vag-Spont   LINDSAY   2  04    F Vag-Spont EPI  LINDSAY   1 Term 02    M Vag-Spont   LINDSAY     Past Medical History:   Diagnosis Date   • Fallot tetralogy    • Ventricular septal defect      Past Surgical History:   Procedure Laterality Date   • CARDIAC SURGERY     • TONSILLECTOMY         No family history on file.  No Known Allergies    Medications Prior to Admission   Medication Sig   • d-methorphan/PE/acetaminophen (ROBITUSSIN COLD-FLU DAY ORAL) Take by mouth   • guaiFENesin (Mucinex) 600 mg 12 hr tablet Take 1,200 mg by mouth 2 (two) times a day   • PNV no.95/ferrous fum/folic ac (PRENATAL ORAL) Take by mouth   • ranitidine HCl (ZANTAC ORAL) Take by mouth       Review of Systems:  A 12 point comprehensive ROS was completed which was all negative.     Vital signs:  There were no vitals filed for this visit.      Physical Exam:  General: well-developed, well-nourished female in no acute distress.  Neurologic: Normal mood, well-oriented. Grossly intact  Heart: Regular rate and rhythm  Lungs: Clear to auscultation bilaterally with decreased BS in the bases  Back: No CVA tenderness  Abdomen: Soft, " non-tender  Extremities: 1+ pitting edema (this is new for her), non-tender  Skin: No rashes or lesions  FHT's: reactive  Toko's: Irregular contractions      LAB:  Maternal Type & Screen     Maternal Type & Screen (External Results)     Test Value Date Time    ABO (External Results) AB  05/31/19     Rh (External Results) Positive  05/31/19     Antibody screen (External Results) Negative  05/31/19             Prenatal Results     Maternal Prenatal Labs     Test Value Date Time    Gonorrhea  Negative  05/31/19     Chlamydia  Negative  06/27/19     Syphilis Ab       RPR        Rubella Reactive-Presumed Immune  05/31/19     HBsAg  Nonreactive  05/31/19     HIV Nonreactive  05/31/19     GBS       GBS Report                   Assessment:  29w2d IUP, Corrected maternal Tetrology of Fallot, AMA, tobacco, fetal double nuchal cord, URI    Plan:    Plan EKG, troponin, CXR and followup as needed. The hospitalist has been consulted, who will order an ECHO as well. Expectant management  Face to face contact was made with the patient       A voice recognition program was used in aid in this medical record documentation. While every effort was made to carefully edit and correct any inaccuracies that may occur, some errors may be present. Please contact our office if you need assistance interpreting any part of this medical record or notice any mistakes.      _____________________________  Thea Sosa MD  09/29/19 2:33 PM

## 2019-09-29 NOTE — CONSULTATION
HOSPITALIST CONSULTATION NOTE     Patient Name: Kenya Lopez  Admission Date: 9/29/2019   YOB: 1985  Date of Service: 9/29/2019    MRN: [id number]  Site: [place], [place]   Length of Stay: 0 Primary Physician: GABINO DUGAN MD   Code Status: No Order  Attending Physician: Thea Sosa MD     HOSPITALIST PROGRESS NOTE (POST OP):   Hospital Medicine consult is requested by Dr. Johnson for evaluation of chest pain.      HPI:    Ms. Kenya Lopez is a 34 y.o. female with PMHx of Tetralogy of Fallot/VSD repair, chronic smoking, currently 29 week gestation.  Presented to labor and delivery for evaluation of cough and right sided chest pain.  Started with cough with scant productive sputum 6 days ago.  Continued to smoke cigarettes, though cut back to just a few cigarettes a day.  Seen in Urgent Care 3 days ago started on Z-Jase. Did not feel much better, and developed pleuritic right-sided chest pain that starts in the back and radiates toward the right side.  Worse with deep breathing and movement otherwise no pain when she is resting.  Does not use any inhalers or nebulizer at home.  Was given a neb treatment at urgent care with some improvement in symptoms, did not continue at home.  Reports no fever/chills, hemoptysis, change in leg swelling, leg cramps, abdominal pain or nausea/vomiting. Follows with Dr. Wilkes for congenital heart disease and has an echo scheduled for next week.  Last echo on 6/12/2019 showed mild LV dysfunction, LVEF 48%, mild MR.    ASSESSMENT & PLAN   Bronchitis  Pleuritic chest pain  29-week gestation  Ongoing smoking  History of tetralogy of Fallot/VSD, s/p repair  Chest x-ray shows no acute infiltrate. Suspect underlying URI/bronchitis with associated pleurisy or musculo-cartilaginous rib sprain.  Need to rule out dissection versus thromboembolism.  She is also quite tender in the right upper quadrant below the costal margin.  No other  symptoms suggestive of acute cholecystitis.  Vitals stable with no ongoing acute symptoms of dissection.  Recommend the following:  -Check d-dimer.  If significantly elevated (>1000), will consider CTA chest  -Stat echocardiogram to assess aortic root (discussed with Cardiology isaiah Driver to do stat study today)  -Bilateral lower extremity venous duplex ultrasound to rule out DVT  -RUQ abdominal ultrasound to assess gallbladder  -If all studies negative, recommend short course of oral prednisone.  Prednisone 40 mg daily for 5 days  -DuoNeb x1 now  -Recommend starting albuterol inhaler 4 times daily.    ROS: Complete 10 systems review is performed & is essentially negative. Pertinent positives & negatives have been highlighted above.     OBJECTIVE:   VITALS:  BP: 97/58.  Heart rate: 81.  Pulse ox 95% on room air.    Ht 1.524 m (5')   Wt 74.4 kg (164 lb 0.4 oz)   BMI 32.03 kg/m²     Patient Vitals for the past 24 hrs:   Height Weight   09/29/19 1356 1.524 m (5') 74.4 kg (164 lb 0.4 oz)     EXAM:  GENERAL: Alert, oriented, conversant, in no distress.  EYES: Normal conjunctiva. Sclera anicteric. No nystagmus. Extraocular movements intact.    NECK: Supple, no lymph adenopathy. No thyromegaly. No carotid bruit. JVP is not distended.  NOSE: Scant clear rhinorrhea  MOUTH: No lesions, exudate or erythema.  THROAT: Normal mucosa. No exudate or erythema.  LUNGS: Coarse breath sounds bilaterally.  No S3 wheezes, rhonchi or crackles.  Equal air entry bilaterally.  HEART: S1 S2, Rate and rhythm is regular. No murmurs thrill or bruit.  ABDOMEN: Moderate right upper quadrant tenderness.  Uterine fundus nontender.  No distension. Bowel sounds are positive.  EXTREMITIES: Trace to 1+ pitting edema. No posterior calf tenderness or swelling. Distal pulses are positive.  SKIN: No rash or ulcers.  NEUROLOGIC: Alert and oriented x3. Speech fluent and normal. Clear mentation. Motor, sensory and cranial exam is grossly intact and  symmetric.   PSYCHIATRIC: Normal affect and cognition    DIAGNOSTIC DATA: Personally reviewed   D-dimer: Pending    IMAGING: (Personally Reviewed)   X-ray Chest 2 Views  Result Date: 9/29/2019  Narrative: Exam: Chest x-ray, 2 views 09/29/2019 Clinical History:  chest pain Comparison(s): 8/20/2019 Findings: The lungs are clear. The heart size prominent unchanged but pulmonary vascularity are within normal limits. Unchanged cardiac postsurgical changes   Impression: IMPRESSION: No acute airspace opacity         Total time - 70 minutes. More than 50% of time spent in direct patient care, care coordination, patient/caregiver counseling, and formalizing plan of care.    Aristeo Ramirez MD  9/29/2019,  2:43 PM   [facility] - Hospitalist Medicine Division

## 2019-09-30 NOTE — CROSS COVER NOTE
The Family Medicine Residency took over care from Dr. Ramirez, hospitalist.  Labs and imaging findings are reviewed with patient.  Echocardiogram does not reveal significant changes to warrant current symptoms of right-sided midaxillary pleuritic pain that is worse with inspiration and with movement.  Patient has suffered from a one-week history of upper respiratory tract infection has not been responsive to antibiotic treatment.  Likely patient has a viral infection and costochondritis secondary to coughing/rib irritation.  Work-up is relatively benign including bilateral ultrasound of the lower extremities given positive d-dimer.  Troponin, EKG, CBC all unremarkable.  Patient was given 2 treatments of DuoNeb's, Tylenol for pain, and lidocaine patch.  She will be discharged with lidocaine patches and encouraged to take Tylenol for costochondral pain.  She would likely have similar symptoms for the next 1 to 3 weeks as her URI symptoms annalee.  Also recommended stretches, deep breathing exercises, Ace bandages, and wearing tight undershirts to help with rib support.  Patient will follow-up with Dr. Johnson, her primary obstetrician, and 4 days.    Please see Dr. Ramirez's consult note for more details with regard to plan of care.    Mohinder Joshi, DO     Diabetes

## 2019-09-30 NOTE — DISCHARGE INSTRUCTIONS
Please take tylenol for pain and apply cold packs to affected area.  You may also use lidocaine patches that can be replaced every 24 hours.  Follow-up with Dr Sosa on Thursday.        You should return to the hospital or contact your provider for any of the following:    · You have noticed a decrease in your baby's activity level, less than 5 movements in one hour after 24 weeks.    · You are bleeding from the vagina. You will have a small amount of bloody discharge after a vaginal exam. This is normal.    · You have a gush of fluid or are leaking fluid.    · Foul smelling vaginal discharge.    · Regular, painful, uterine contractions, lasting 45-60 seconds, every 3-4 minutes if you are more than 37 weeks.    · If less than 37 weeks and having 4 or more contractions per hour.     · Severe headache, blurred vision, spots in front of your eyes.    · Sudden weight gain or swelling in your face, hands, or feet.    · Persistent discomfort across your upper abdomen.    · Burning when you urinate.  Increased frequency or urgent sensation to urinate.    · You have a temperature of 100.5 degrees Fahrenheit or greater.    · Nausea or vomiting for more than 24 hours and unable to keep down fluids.    · Your abdomen becomes hard and does not relax.    · Severe abdominal pain.    · If you experience any abdominal trauma.

## 2019-09-30 NOTE — NURSING END OF SHIFT
Nursing End of Shift Summary    Goals:   Decrease pain     Narrative Summary of Progress Towards Clinical Goals:  Pt continues to progress towards goals.     Barriers for Transfer or Discharge: no      SBAR to JASE Masterson RN to assume care of pt. Yumiko Vera RN 9/29/2019 7:28 PM

## 2019-11-17 ENCOUNTER — HOSPITAL ENCOUNTER (OUTPATIENT)
Facility: HOSPITAL | Age: 34
Discharge: HOME | End: 2019-11-17
Attending: OBSTETRICS & GYNECOLOGY | Admitting: OBSTETRICS & GYNECOLOGY
Payer: COMMERCIAL

## 2019-11-17 VITALS — TEMPERATURE: 98.4 F | RESPIRATION RATE: 22 BRPM

## 2019-11-17 PROCEDURE — G0463 HOSPITAL OUTPT CLINIC VISIT: HCPCS | Performed by: OBSTETRICS & GYNECOLOGY

## 2019-11-17 NOTE — OR NURSING
Pt given water, juice and food. Discharge instruction discussed with pt and significant other; understanding verbalized.  Pt dressing and taken to waiting vehicle by ANTOINE Villarreal.    Kaila Kenney RN

## 2019-11-17 NOTE — DISCHARGE INSTRUCTIONS
Dr. Tong has recommended that you drive directly to [facility].  Stop at any clinic if you experience:  1. A gush or trickle of fluid that requires you to change a pad.  2. Vaginal bleeding that is bright red or filling 1 pad or more per hour.  3. Pain in your abdomen that will not resolve.    Drink large amounts of water on your trip.

## 2019-12-12 ENCOUNTER — TELEPHONE (OUTPATIENT)
Dept: FAMILY MEDICINE | Facility: CLINIC | Age: 34
End: 2019-12-12

## 2019-12-12 NOTE — TELEPHONE ENCOUNTER
Spoke with Pt who reports she had baby on 19 and Dr. Vanegas told her to get IUD before 6 week follow up.  She reports having  section. She would like Copper IUD.  Pt scheduled 19 with Dr. Martinez-60 minute time slot.

## 2019-12-23 ENCOUNTER — OFFICE VISIT (OUTPATIENT)
Dept: FAMILY MEDICINE | Facility: CLINIC | Age: 34
End: 2019-12-23
Payer: COMMERCIAL

## 2019-12-23 VITALS
DIASTOLIC BLOOD PRESSURE: 66 MMHG | SYSTOLIC BLOOD PRESSURE: 108 MMHG | RESPIRATION RATE: 18 BRPM | BODY MASS INDEX: 28.12 KG/M2 | WEIGHT: 144 LBS | OXYGEN SATURATION: 97 % | HEART RATE: 54 BPM | TEMPERATURE: 96.8 F

## 2019-12-23 DIAGNOSIS — Z30.018 ENCOUNTER FOR INITIAL PRESCRIPTION OF OTHER CONTRACEPTIVES: Primary | ICD-10-CM

## 2019-12-23 PROCEDURE — 58300 INSERT INTRAUTERINE DEVICE: CPT | Performed by: STUDENT IN AN ORGANIZED HEALTH CARE EDUCATION/TRAINING PROGRAM

## 2019-12-23 PROCEDURE — 58301 REMOVE INTRAUTERINE DEVICE: CPT | Performed by: STUDENT IN AN ORGANIZED HEALTH CARE EDUCATION/TRAINING PROGRAM

## 2019-12-23 PROCEDURE — 99999 PR OFFICE/OUTPT VISIT,PROCEDURE ONLY: CPT | Mod: 25 | Performed by: STUDENT IN AN ORGANIZED HEALTH CARE EDUCATION/TRAINING PROGRAM

## 2019-12-23 RX ORDER — LEVONORGESTREL 52 MG/1
1 INTRAUTERINE DEVICE INTRAUTERINE
COMMUNITY
Start: 2019-12-23 | End: 2020-02-02 | Stop reason: HOSPADM

## 2019-12-23 RX ORDER — METOPROLOL TARTRATE 25 MG/1
12.5 TABLET, FILM COATED ORAL
COMMUNITY
Start: 2019-08-21 | End: 2020-02-01

## 2019-12-23 RX ORDER — SERTRALINE HYDROCHLORIDE 50 MG/1
50 TABLET, FILM COATED ORAL DAILY
Qty: 30 TABLET | Refills: 2 | Status: SHIPPED | OUTPATIENT
Start: 2019-12-23 | End: 2020-12-22

## 2019-12-23 NOTE — PROGRESS NOTES
IUD Removal/Insertion  Date/Time: 12/23/2019 12:25 PM  Performed by: Ana Martinez DO      Consent  Verbal consent was obtained from the patient. Written consent was obtained from the patient. Risks (including risks of insertion, pelvic infection and the possibility of failure), benefits and alternatives were discussed. Consent given by patient. The patient states understanding of the procedure being performed. Patient ID confirmed verbally with the patient.     Pre-Procedure  Pregnancy test was not performed. Kenya has a current PAP performed on 5/19/2019. A IUD will be removed and a Mirena will be inserted.     Procedure Details   Kenya was placed in a dorsal lithotomy position. Pelvic exam performed and was normal. Bimanual exam showed uterus to be anteverted. Speculum was inserted into the vagina. Cervix and vagina were cleaned and prepped with povidine iodine. Cervix stabilized with Tenaculum. Uterus sounded to a depth of 7 cm. IUD inserted into endometrial cavity with no complications. IUD strings trimmed to 4 cm.     Post-Procedure  Patient tolerated the procedure well with no complications.     Procedure Comments  Pt not yet sexually active post partum thus pregnancy test not performed. Discussed spotting and cramping following procedure. Pt may take Tylenol for symptoms as needed.

## 2019-12-23 NOTE — PATIENT INSTRUCTIONS
"  Patient Education     Postpartum Baby Blues  The postpartum period begins right after the birth of a baby. During this time, there is often a lot of tito and excitement. It is also a time of many changes in the life of the parents. No matter how many times a mother gives birth, each child brings new challenges to the family, including different ways of relating to one another.  It is common to have feelings of excitement along with confusing changes in moods, emotions, and thoughts. You may feel happy one minute and sad or stressed the next. These feelings of sadness usually happen in the period right after you have your baby, and they go away within a week or two. This is called the \"baby blues.\"  What are the causes?  There is no known cause of baby blues. It is likely caused by a combination of factors. However, changes in hormone levels after childbirth are believed to trigger some of the symptoms.  Other factors that can play a role in these mood changes include:  · Lack of sleep.  · Stressful life events, such as poverty, caring for a loved one, or death of a loved one.  · Genetics.  What are the signs or symptoms?  Symptoms of this condition include:  · Brief changes in mood, such as going from extreme happiness to sadness.  · Decreased concentration.  · Difficulty sleeping.  · Crying spells and tearfulness.  · Loss of appetite.  · Irritability.  · Anxiety.  If the symptoms of baby blues last for more than 2 weeks or become more severe, you may have postpartum depression.  How is this diagnosed?  This condition is diagnosed based on an evaluation of your symptoms. There are no medical or lab tests that lead to a diagnosis, but there are various questionnaires that a health care provider may use to identify women with the baby blues or postpartum depression.  How is this treated?  Treatment is not needed for this condition. The baby blues usually go away on their own in 1-2 weeks. Social support is often all " that is needed. You will be encouraged to get adequate sleep and rest.  Follow these instructions at home:  Lifestyle         · Get as much rest as you can. Take a nap when the baby sleeps.  · Exercise regularly as told by your health care provider. Some women find yoga and walking to be helpful.  · Eat a balanced and nourishing diet. This includes plenty of fruits and vegetables, whole grains, and lean proteins.  · Do little things that you enjoy. Have a cup of tea, take a bubble bath, read your favorite magazine, or listen to your favorite music.  · Avoid alcohol.  · Ask for help with household chores, cooking, grocery shopping, or running errands. Do not try to do everything yourself. Consider hiring a postpartum  to help. This is a professional who specializes in providing support to new mothers.  · Try not to make any major life changes during pregnancy or right after giving birth. This can add stress.  General instructions  · Talk to people close to you about how you are feeling. Get support from your partner, family members, friends, or other new moms. You may want to join a support group.  · Find ways to cope with stress. This may include:  ? Writing your thoughts and feelings in a journal.  ? Spending time outside.  ? Spending time with people who make you laugh.  · Try to stay positive in how you think. Think about the things you are grateful for.  · Take over-the-counter and prescription medicines only as told by your health care provider.  · Let your health care provider know if you have any concerns.  · Keep all postpartum visits as told by your health care provider. This is important.  Contact a health care provider if:  · Your baby blues do not go away after 2 weeks.  Get help right away if:  · You have thoughts of taking your own life (suicidal thoughts).  · You think you may harm the baby or other people.  · You see or hear things that are not there (hallucinations).  Summary  · After giving  "birth, you may feel happy one minute and sad or stressed the next. Feelings of sadness that happen right after the baby is born and go away after a week or two are called the \"baby blues.\"  · You can manage the baby blues by getting enough rest, eating a healthy diet, exercising, spending time with supportive people, and finding ways to cope with stress.  · If feelings of sadness and stress last longer than 2 weeks or get in the way of caring for your baby, talk to your health care provider. This may mean you have postpartum depression.  This information is not intended to replace advice given to you by your health care provider. Make sure you discuss any questions you have with your health care provider.  Document Released: 2005 Document Revised: 2018 Document Reviewed: 2018  [employer] Interactive Patient Education © 2019 [employer] Inc.       Patient Education    Depression  You will learn about  depression, including risk factors, symptoms and treatment options.  To view the content, go to this web address:  [email or web address]    This video will  on: 2021. If you need access to this video following this date, please reach out to the healthcare provider who assigned it to you.  This information is not intended to replace advice given to you by your health care provider. Make sure you discuss any questions you have with your health care provider.  [employer]’s editorial and clinical teams regularly review and update content to ensure it is up-to-date with changing practice standards and recognized medical guidelines.  [employer] Interactive Patient Education © 2019 Elsevier Inc.       Patient Education   Birth Control, Hormone Implants  You will find out what these implants are, where they are placed, and their side effects.  To view the content, go to this web address:  [email or web address]    This video will  on: 2021. If you need access " to this video following this date, please reach out to the healthcare provider who assigned it to you.  This information is not intended to replace advice given to you by your health care provider. Make sure you discuss any questions you have with your health care provider.  [employer]’s editorial and clinical teams regularly review and update content to ensure it is up-to-date with changing practice standards and recognized medical guidelines.  [employer] Interactive Patient Education © 2019 Elsevier Inc.

## 2019-12-23 NOTE — PROGRESS NOTES
SUBJECTIVE:    Kenya Lopez is a 34 y.o. female who comes into the office today for a postpartum visit.  She is feeling tired and depressed.  Notes very traumatic delivery in [place] with  delivery. Follow with Dr. Sosa for continued PP, referred here for IUD placement per pt report.   OB History        5    Para   3    Term   2       1    AB   1    Living   3       SAB   1    TAB   0    Ectopic   0    Molar   0    Multiple   0    Live Births   3               Concerns: depressed.   Breast feeding: yes with formula  Episiotomy or Laceration: Surgical laceration well-healing  Postpartum depression symptoms: Yes-Burton  depression 11  Bowel or bladder problems: Denies  Contraception: Wishes for IUD placement    OBJECTIVE:     /66   Pulse 54   Temp 36 °C (96.8 °F) (Temporal)   Resp 18   Wt 65.3 kg (144 lb)   SpO2 97%   BMI 28.12 kg/m²   GEN: She is alert, in no acute distress.   HEENT:  TMs normal bilaterally.  Oropharynx is clear, moist, nonerythematous.   NECK: Neck is supple without adenopathy.   RESP: Lungs are clear to auscultation.   CARDIO: RRR without murmur.  Gi: soft, nontender to palpation.  Bowel sounds present.  No  masses.  No hepatosplenomegaly.   MSK: no edema.    Breast:  no masses.  No axillary adenopathy.  Skin: Incision site appears to be well healed.    :  External genital is normal in appearance. Speculum exam is done. IUD placed Cervix visualized. No adnexal masses.     ASSESSMENT: Postpartum exam. P   Diagnosis Plan   1. Encounter for initial prescription of other contraceptives  levonorgestrel (MIRENA) 20 mcg/24 hours (5 yrs) 52 mg IUD 1 Device   2. Postpartum depression  sertraline (ZOLOFT) 50 mg tablet        PLAN:  Mirena IUD placed per pt preference following risks and benefits.  Although patient initially presented for copper IUD placement, patient states that she previously has had Mirena and does prefer Mirena from the  standpoint of menstrual regularity and heaviness.  Discussed safety profile during breast-feeding.  See procedure note for further details      Will start pt on Sertraline 50mg daily and asked pt to call with update on symptoms in 2 weeks. Discussed crisis care center. Pt will continue talking with the [facility] nurse, she declines formal counseling. Pt will look into new mothers support groups, for emotional support and camaraderie.   Plan to see her back in one year, sooner with concerns.    Reviewed pap guidelines.  Date of Last Pap Smear:5/31/19 follow up with Dr. Sosa per pt preference      Ana Martinez DO

## 2020-01-23 ENCOUNTER — TELEPHONE (OUTPATIENT)
Dept: FAMILY MEDICINE | Facility: CLINIC | Age: 35
End: 2020-01-23

## 2020-01-23 NOTE — TELEPHONE ENCOUNTER
Call placed to patient. Number still ringing busy. Will try again later. Dr. Martinez did sent a portal message to patient.

## 2020-01-23 NOTE — TELEPHONE ENCOUNTER
Caller would like to discuss (a)  Writer has advised caller of a callback from within 24 hours.    Patient: Kenya Lopez    Caller Name (Last and first, relation/role): self    Name of Facility: na    Callback Number: [phone]    Best Availability: anytime    Fax Number: na    Additional Info: Patient wanting to know if FMR can do testing for pancreatitis.     Did you confirm the message with the caller: Yes    Is it okay that the nurse communicates your response through [email or web address]? No

## 2020-01-24 NOTE — TELEPHONE ENCOUNTER
Fourth attempted to reach patient, number rings busy. No other number in chart listed to call. Dr. Martinez did send a message in the portal and patient has not responded.

## 2020-02-01 ENCOUNTER — ANESTHESIA (OUTPATIENT)
Dept: OPERATING ROOM | Facility: HOSPITAL | Age: 35
End: 2020-02-01

## 2020-02-01 ENCOUNTER — ANESTHESIA EVENT (OUTPATIENT)
Dept: OPERATING ROOM | Facility: HOSPITAL | Age: 35
End: 2020-02-01

## 2020-02-01 ENCOUNTER — APPOINTMENT (OUTPATIENT)
Dept: CT IMAGING | Facility: HOSPITAL | Age: 35
End: 2020-02-01

## 2020-02-01 ENCOUNTER — HOSPITAL ENCOUNTER (OUTPATIENT)
Facility: HOSPITAL | Age: 35
Setting detail: OBSERVATION
Discharge: HOME | End: 2020-02-02
Attending: EMERGENCY MEDICINE | Admitting: SURGERY

## 2020-02-01 DIAGNOSIS — R10.11 RIGHT UPPER QUADRANT ABDOMINAL PAIN: Primary | ICD-10-CM

## 2020-02-01 DIAGNOSIS — E86.0 DEHYDRATION: ICD-10-CM

## 2020-02-01 DIAGNOSIS — D72.829 LEUKOCYTOSIS: ICD-10-CM

## 2020-02-01 DIAGNOSIS — K80.50 BILIARY COLIC: ICD-10-CM

## 2020-02-01 DIAGNOSIS — Z30.431 INTRAUTERINE CONTRACEPTIVE DEVICE, CHECKING: ICD-10-CM

## 2020-02-01 DIAGNOSIS — R74.01 ELEVATED TRANSAMINASE LEVEL: ICD-10-CM

## 2020-02-01 DIAGNOSIS — K81.9 CHOLECYSTITIS: ICD-10-CM

## 2020-02-01 DIAGNOSIS — R11.10 VOMITING: ICD-10-CM

## 2020-02-01 PROBLEM — K80.00 ACUTE CHOLECYSTITIS DUE TO BILIARY CALCULUS: Status: ACTIVE | Noted: 2020-02-01

## 2020-02-01 LAB
ALBUMIN SERPL-MCNC: 4.4 G/DL (ref 3.5–5.3)
ALP SERPL-CCNC: 80 U/L (ref 37–98)
ALT SERPL-CCNC: 247 U/L (ref 0–52)
AMORPH CRY #/AREA UR COMP ASSIST: PRESENT /HPF
ANION GAP SERPL CALC-SCNC: 11 MMOL/L (ref 3–11)
AST SERPL-CCNC: 315 U/L (ref 0–39)
BACTERIA #/AREA URNS AUTO: ABNORMAL /HPF
BASOPHILS # BLD AUTO: 0.1 10*3/UL
BASOPHILS NFR BLD AUTO: 1 % (ref 0–2)
BILIRUB SERPL-MCNC: 0.79 MG/DL (ref 0–1.4)
BILIRUB UR QL STRIP.AUTO: ABNORMAL
BUN SERPL-MCNC: 7 MG/DL (ref 7–25)
CALCIUM ALBUM COR SERPL-MCNC: 9.3 MG/DL (ref 8.6–10.3)
CALCIUM SERPL-MCNC: 9.6 MG/DL (ref 8.6–10.3)
CHLORIDE SERPL-SCNC: 102 MMOL/L (ref 98–107)
CLARITY UR: CLEAR
CO2 SERPL-SCNC: 27 MMOL/L (ref 21–32)
COLOR UR: ABNORMAL
CREAT SERPL-MCNC: 0.65 MG/DL (ref 0.6–1.2)
EOSINOPHIL # BLD AUTO: 0.3 10*3/UL
EOSINOPHIL NFR BLD AUTO: 2 % (ref 0–3)
ERYTHROCYTE [DISTWIDTH] IN BLOOD BY AUTOMATED COUNT: 13.6 % (ref 11.5–14)
GFR SERPL CREATININE-BSD FRML MDRD: 115 ML/MIN/1.73M*2
GLUCOSE SERPL-MCNC: 88 MG/DL (ref 70–105)
GLUCOSE UR STRIP.AUTO-MCNC: NEGATIVE MG/DL
HCT VFR BLD AUTO: 44.3 % (ref 34–45)
HGB BLD-MCNC: 15.3 G/DL (ref 11.5–15.5)
HGB UR QL STRIP.AUTO: NEGATIVE
KETONES UR STRIP.AUTO-MCNC: NEGATIVE MG/DL
LEUKOCYTE ESTERASE UR QL STRIP: NEGATIVE
LIPASE SERPL-CCNC: 21 U/L (ref 11–82)
LYMPHOCYTES # BLD AUTO: 2.2 10*3/UL
LYMPHOCYTES NFR BLD AUTO: 20 % (ref 11–47)
MAGNESIUM SERPL-MCNC: 2.1 MG/DL (ref 1.8–2.4)
MCH RBC QN AUTO: 30.1 PG (ref 28–33)
MCHC RBC AUTO-ENTMCNC: 34.4 G/DL (ref 32–36)
MCV RBC AUTO: 87.5 FL (ref 81–97)
MONOCYTES # BLD AUTO: 0.9 10*3/UL
MONOCYTES NFR BLD AUTO: 8 % (ref 3–11)
MUCOUS THREADS #/AREA URNS HPF: PRESENT /[HPF]
NEUTROPHILS # BLD AUTO: 7.6 10*3/UL
NEUTROPHILS NFR BLD AUTO: 69 % (ref 41–81)
NITRITE UR QL STRIP.AUTO: NEGATIVE
PH UR STRIP.AUTO: 8 PH
PLATELET # BLD AUTO: 237 10*3/UL (ref 140–350)
PLATELET CLUMP BLD QL AUTO: ABNORMAL
PMV BLD AUTO: 7.1 FL (ref 6.9–10.8)
POTASSIUM SERPL-SCNC: 3.9 MMOL/L (ref 3.5–5.1)
PROT SERPL-MCNC: 7 G/DL (ref 6–8.3)
PROT UR STRIP.AUTO-MCNC: 100 MG/DL
RBC # BLD AUTO: 5.07 10*6/ΜL (ref 3.7–5.3)
RBC #/AREA URNS AUTO: ABNORMAL /HPF
SODIUM SERPL-SCNC: 140 MMOL/L (ref 135–145)
SP GR UR STRIP.AUTO: 1.02 (ref 1–1.03)
SQUAMOUS #/AREA URNS AUTO: ABNORMAL /HPF
UROBILINOGEN UR STRIP.AUTO-MCNC: 2 E.U./DL
WBC # BLD AUTO: 11 10*3/UL (ref 4.5–10.5)
WBC #/AREA URNS AUTO: ABNORMAL /HPF

## 2020-02-01 PROCEDURE — 83690 ASSAY OF LIPASE: CPT | Performed by: EMERGENCY MEDICINE

## 2020-02-01 PROCEDURE — 96375 TX/PRO/DX INJ NEW DRUG ADDON: CPT

## 2020-02-01 PROCEDURE — 80053 COMPREHEN METABOLIC PANEL: CPT | Performed by: EMERGENCY MEDICINE

## 2020-02-01 PROCEDURE — 2550000100 HC RX 255: Mod: JW | Performed by: EMERGENCY MEDICINE

## 2020-02-01 PROCEDURE — 3600001000 HC OR LEVEL 5 PROC EACH ADDITIONAL MIN: Performed by: SURGERY

## 2020-02-01 PROCEDURE — 00790 ANES IPER UPR ABD NOS: CPT | Performed by: NURSE ANESTHETIST, CERTIFIED REGISTERED

## 2020-02-01 PROCEDURE — (BLANK) HC GENERAL ANESTHESIA FACILITY CHARGE EACH ADDITIONAL MIN: Performed by: SURGERY

## 2020-02-01 PROCEDURE — 96376 TX/PRO/DX INJ SAME DRUG ADON: CPT

## 2020-02-01 PROCEDURE — G1004 CDSM NDSC: HCPCS

## 2020-02-01 PROCEDURE — 2580000300 HC RX 258: Performed by: SURGERY

## 2020-02-01 PROCEDURE — 96374 THER/PROPH/DIAG INJ IV PUSH: CPT

## 2020-02-01 PROCEDURE — 6360000200 HC RX 636 W HCPCS (ALT 250 FOR IP): Performed by: SURGERY

## 2020-02-01 PROCEDURE — 6360000200 HC RX 636 W HCPCS (ALT 250 FOR IP): Performed by: NURSE ANESTHETIST, CERTIFIED REGISTERED

## 2020-02-01 PROCEDURE — 99219 *NO LONGER ACTIVE 2023 DO NOT USE* PR INITIAL OBSERVATION CARE/DAY 50 MINUTES: CPT | Mod: 57 | Performed by: SURGERY

## 2020-02-01 PROCEDURE — 2580000300 HC RX 258: Performed by: EMERGENCY MEDICINE

## 2020-02-01 PROCEDURE — 99285 EMERGENCY DEPT VISIT HI MDM: CPT | Performed by: EMERGENCY MEDICINE

## 2020-02-01 PROCEDURE — 2580000300 HC RX 258: Performed by: ANESTHESIOLOGY

## 2020-02-01 PROCEDURE — 36415 COLL VENOUS BLD VENIPUNCTURE: CPT | Performed by: EMERGENCY MEDICINE

## 2020-02-01 PROCEDURE — (BLANK) HC RECOVERY PHASE-1 EACH ADDITIONAL  1/2 HOUR ACUITY LEVEL 2: Performed by: SURGERY

## 2020-02-01 PROCEDURE — 85025 COMPLETE CBC W/AUTO DIFF WBC: CPT | Performed by: EMERGENCY MEDICINE

## 2020-02-01 PROCEDURE — 6360000200 HC RX 636 W HCPCS (ALT 250 FOR IP)

## 2020-02-01 PROCEDURE — 83735 ASSAY OF MAGNESIUM: CPT | Performed by: EMERGENCY MEDICINE

## 2020-02-01 PROCEDURE — 47562 LAPAROSCOPIC CHOLECYSTECTOMY: CPT | Performed by: SURGERY

## 2020-02-01 PROCEDURE — G0378 HOSPITAL OBSERVATION PER HR: HCPCS

## 2020-02-01 PROCEDURE — (BLANK) HC RECOVERY PHASE-1 1ST  HOUR ACUITY LEVEL 2: Performed by: SURGERY

## 2020-02-01 PROCEDURE — 6360000200 HC RX 636 W HCPCS (ALT 250 FOR IP): Performed by: EMERGENCY MEDICINE

## 2020-02-01 PROCEDURE — 2500000200 HC RX 250 WO HCPCS: Performed by: NURSE ANESTHETIST, CERTIFIED REGISTERED

## 2020-02-01 PROCEDURE — (BLANK) HC GENERAL ANESTHESIA FACILITY CHARGE 1ST 15 MIN: Performed by: SURGERY

## 2020-02-01 PROCEDURE — (BLANK) HC OR LEVEL 5 PROC 1ST 15MIN: Performed by: SURGERY

## 2020-02-01 PROCEDURE — 81001 URINALYSIS AUTO W/SCOPE: CPT | Performed by: EMERGENCY MEDICINE

## 2020-02-01 RX ORDER — FENTANYL CITRATE/PF 50 MCG/ML
PLASTIC BAG, INJECTION (ML) INTRAVENOUS
Status: COMPLETED
Start: 2020-02-01 | End: 2020-02-01

## 2020-02-01 RX ORDER — DOCUSATE SODIUM 100 MG/1
100 CAPSULE, LIQUID FILLED ORAL 2 TIMES DAILY
Status: DISCONTINUED | OUTPATIENT
Start: 2020-02-01 | End: 2020-02-02 | Stop reason: HOSPADM

## 2020-02-01 RX ORDER — SERTRALINE HYDROCHLORIDE 50 MG/1
50 TABLET, FILM COATED ORAL DAILY
Status: DISCONTINUED | OUTPATIENT
Start: 2020-02-02 | End: 2020-02-02 | Stop reason: HOSPADM

## 2020-02-01 RX ORDER — ONDANSETRON HYDROCHLORIDE 2 MG/ML
4 INJECTION, SOLUTION INTRAVENOUS ONCE
Status: COMPLETED | OUTPATIENT
Start: 2020-02-01 | End: 2020-02-01

## 2020-02-01 RX ORDER — SODIUM CHLORIDE 0.9 % (FLUSH) 0.9 %
2 SYRINGE (ML) INJECTION EVERY 8 HOURS SCHEDULED
Status: DISCONTINUED | OUTPATIENT
Start: 2020-02-01 | End: 2020-02-01 | Stop reason: HOSPADM

## 2020-02-01 RX ORDER — KETOROLAC TROMETHAMINE 30 MG/ML
INJECTION, SOLUTION INTRAMUSCULAR; INTRAVENOUS
Status: COMPLETED
Start: 2020-02-01 | End: 2020-02-01

## 2020-02-01 RX ORDER — BUPIVACAINE HYDROCHLORIDE 2.5 MG/ML
INJECTION, SOLUTION EPIDURAL; INFILTRATION; INTRACAUDAL AS NEEDED
Status: DISCONTINUED | OUTPATIENT
Start: 2020-02-01 | End: 2020-02-01 | Stop reason: HOSPADM

## 2020-02-01 RX ORDER — OXYCODONE HYDROCHLORIDE 5 MG/1
5 TABLET ORAL EVERY 4 HOURS PRN
Status: DISCONTINUED | OUTPATIENT
Start: 2020-02-01 | End: 2020-02-02 | Stop reason: HOSPADM

## 2020-02-01 RX ORDER — ONDANSETRON HYDROCHLORIDE 2 MG/ML
4 INJECTION, SOLUTION INTRAVENOUS ONCE AS NEEDED
Status: DISCONTINUED | OUTPATIENT
Start: 2020-02-01 | End: 2020-02-01 | Stop reason: HOSPADM

## 2020-02-01 RX ORDER — IOPAMIDOL 755 MG/ML
95 INJECTION, SOLUTION INTRAVASCULAR ONCE
Status: COMPLETED | OUTPATIENT
Start: 2020-02-01 | End: 2020-02-01

## 2020-02-01 RX ORDER — METOPROLOL TARTRATE 1 MG/ML
1 INJECTION, SOLUTION INTRAVENOUS EVERY 5 MIN PRN
Status: DISCONTINUED | OUTPATIENT
Start: 2020-02-01 | End: 2020-02-01 | Stop reason: HOSPADM

## 2020-02-01 RX ORDER — MIDAZOLAM HYDROCHLORIDE 1 MG/ML
1 INJECTION INTRAMUSCULAR; INTRAVENOUS EVERY 5 MIN PRN
Status: DISCONTINUED | OUTPATIENT
Start: 2020-02-01 | End: 2020-02-01 | Stop reason: HOSPADM

## 2020-02-01 RX ORDER — SODIUM CHLORIDE 9 MG/ML
1000 INJECTION, SOLUTION INTRAVENOUS ONCE
Status: COMPLETED | OUTPATIENT
Start: 2020-02-01 | End: 2020-02-01

## 2020-02-01 RX ORDER — LIDOCAINE HYDROCHLORIDE 20 MG/ML
INJECTION, SOLUTION EPIDURAL; INFILTRATION; INTRACAUDAL; PERINEURAL AS NEEDED
Status: DISCONTINUED | OUTPATIENT
Start: 2020-02-01 | End: 2020-02-01 | Stop reason: SURG

## 2020-02-01 RX ORDER — KETOROLAC TROMETHAMINE 30 MG/ML
30 INJECTION, SOLUTION INTRAMUSCULAR; INTRAVENOUS EVERY 6 HOURS
Status: DISCONTINUED | OUTPATIENT
Start: 2020-02-01 | End: 2020-02-02 | Stop reason: HOSPADM

## 2020-02-01 RX ORDER — DIPHENHYDRAMINE HYDROCHLORIDE 50 MG/ML
25 INJECTION INTRAMUSCULAR; INTRAVENOUS ONCE AS NEEDED
Status: DISCONTINUED | OUTPATIENT
Start: 2020-02-01 | End: 2020-02-01 | Stop reason: HOSPADM

## 2020-02-01 RX ORDER — PROPOFOL 10 MG/ML
INJECTION, EMULSION INTRAVENOUS AS NEEDED
Status: DISCONTINUED | OUTPATIENT
Start: 2020-02-01 | End: 2020-02-01 | Stop reason: SURG

## 2020-02-01 RX ORDER — SIMETHICONE 80 MG
160 TABLET,CHEWABLE ORAL EVERY 4 HOURS PRN
Status: DISCONTINUED | OUTPATIENT
Start: 2020-02-01 | End: 2020-02-02 | Stop reason: HOSPADM

## 2020-02-01 RX ORDER — LIDOCAINE HYDROCHLORIDE 10 MG/ML
INJECTION, SOLUTION INFILTRATION; PERINEURAL AS NEEDED
Status: DISCONTINUED | OUTPATIENT
Start: 2020-02-01 | End: 2020-02-01 | Stop reason: HOSPADM

## 2020-02-01 RX ORDER — ONDANSETRON HYDROCHLORIDE 2 MG/ML
4 INJECTION, SOLUTION INTRAVENOUS EVERY 6 HOURS PRN
Status: DISCONTINUED | OUTPATIENT
Start: 2020-02-01 | End: 2020-02-02 | Stop reason: HOSPADM

## 2020-02-01 RX ORDER — HYDROMORPHONE HYDROCHLORIDE 1 MG/ML
INJECTION, SOLUTION INTRAMUSCULAR; INTRAVENOUS; SUBCUTANEOUS
Status: COMPLETED
Start: 2020-02-01 | End: 2020-02-01

## 2020-02-01 RX ORDER — FENTANYL CITRATE/PF 50 MCG/ML
PLASTIC BAG, INJECTION (ML) INTRAVENOUS AS NEEDED
Status: DISCONTINUED | OUTPATIENT
Start: 2020-02-01 | End: 2020-02-01 | Stop reason: SURG

## 2020-02-01 RX ORDER — DEXAMETHASONE SODIUM PHOSPHATE 4 MG/ML
4 INJECTION, SOLUTION INTRA-ARTICULAR; INTRALESIONAL; INTRAMUSCULAR; INTRAVENOUS; SOFT TISSUE ONCE AS NEEDED
Status: DISCONTINUED | OUTPATIENT
Start: 2020-02-01 | End: 2020-02-01 | Stop reason: HOSPADM

## 2020-02-01 RX ORDER — DIPHENHYDRAMINE HYDROCHLORIDE 50 MG/ML
INJECTION INTRAMUSCULAR; INTRAVENOUS
Status: COMPLETED
Start: 2020-02-01 | End: 2020-02-01

## 2020-02-01 RX ORDER — FENTANYL CITRATE/PF 50 MCG/ML
50 PLASTIC BAG, INJECTION (ML) INTRAVENOUS EVERY 5 MIN PRN
Status: DISCONTINUED | OUTPATIENT
Start: 2020-02-01 | End: 2020-02-01 | Stop reason: HOSPADM

## 2020-02-01 RX ORDER — MORPHINE SULFATE 4 MG/ML
3-4 INJECTION, SOLUTION INTRAMUSCULAR; INTRAVENOUS
Status: DISCONTINUED | OUTPATIENT
Start: 2020-02-01 | End: 2020-02-02 | Stop reason: HOSPADM

## 2020-02-01 RX ORDER — HYDROMORPHONE HYDROCHLORIDE 1 MG/ML
0.5 INJECTION, SOLUTION INTRAMUSCULAR; INTRAVENOUS; SUBCUTANEOUS EVERY 5 MIN PRN
Status: DISCONTINUED | OUTPATIENT
Start: 2020-02-01 | End: 2020-02-01 | Stop reason: HOSPADM

## 2020-02-01 RX ORDER — MORPHINE SULFATE 4 MG/ML
4 INJECTION, SOLUTION INTRAMUSCULAR; INTRAVENOUS ONCE
Status: COMPLETED | OUTPATIENT
Start: 2020-02-01 | End: 2020-02-01

## 2020-02-01 RX ORDER — DIPHENHYDRAMINE HYDROCHLORIDE 50 MG/ML
25 INJECTION INTRAMUSCULAR; INTRAVENOUS ONCE
Status: COMPLETED | OUTPATIENT
Start: 2020-02-01 | End: 2020-02-01

## 2020-02-01 RX ORDER — INDOCYANINE GREEN AND WATER 25 MG
KIT INJECTION AS NEEDED
Status: DISCONTINUED | OUTPATIENT
Start: 2020-02-01 | End: 2020-02-01 | Stop reason: SURG

## 2020-02-01 RX ORDER — SODIUM CHLORIDE 900 MG/100ML
INJECTION INTRAVENOUS
Status: DISCONTINUED
Start: 2020-02-01 | End: 2020-02-02 | Stop reason: HOSPADM

## 2020-02-01 RX ORDER — SODIUM CHLORIDE 0.9 % (FLUSH) 0.9 %
2 SYRINGE (ML) INJECTION AS NEEDED
Status: DISCONTINUED | OUTPATIENT
Start: 2020-02-01 | End: 2020-02-01 | Stop reason: HOSPADM

## 2020-02-01 RX ORDER — ROCURONIUM BROMIDE 50 MG/5 ML
SYRINGE (ML) INTRAVENOUS AS NEEDED
Status: DISCONTINUED | OUTPATIENT
Start: 2020-02-01 | End: 2020-02-01 | Stop reason: SURG

## 2020-02-01 RX ORDER — KETOROLAC TROMETHAMINE 30 MG/ML
30 INJECTION, SOLUTION INTRAMUSCULAR; INTRAVENOUS ONCE
Status: COMPLETED | OUTPATIENT
Start: 2020-02-01 | End: 2020-02-01

## 2020-02-01 RX ORDER — DEXAMETHASONE SODIUM PHOSPHATE 4 MG/ML
INJECTION, SOLUTION INTRA-ARTICULAR; INTRALESIONAL; INTRAMUSCULAR; INTRAVENOUS; SOFT TISSUE AS NEEDED
Status: DISCONTINUED | OUTPATIENT
Start: 2020-02-01 | End: 2020-02-01 | Stop reason: SURG

## 2020-02-01 RX ORDER — ACETAMINOPHEN 325 MG/1
325-650 TABLET ORAL EVERY 4 HOURS PRN
Status: DISCONTINUED | OUTPATIENT
Start: 2020-02-01 | End: 2020-02-02 | Stop reason: HOSPADM

## 2020-02-01 RX ORDER — ONDANSETRON HYDROCHLORIDE 2 MG/ML
INJECTION, SOLUTION INTRAVENOUS
Status: COMPLETED
Start: 2020-02-01 | End: 2020-02-01

## 2020-02-01 RX ORDER — SODIUM CHLORIDE, SODIUM LACTATE, POTASSIUM CHLORIDE, CALCIUM CHLORIDE 600; 310; 30; 20 MG/100ML; MG/100ML; MG/100ML; MG/100ML
100 INJECTION, SOLUTION INTRAVENOUS CONTINUOUS
Status: DISCONTINUED | OUTPATIENT
Start: 2020-02-01 | End: 2020-02-01 | Stop reason: HOSPADM

## 2020-02-01 RX ORDER — ONDANSETRON HYDROCHLORIDE 2 MG/ML
INJECTION, SOLUTION INTRAVENOUS AS NEEDED
Status: DISCONTINUED | OUTPATIENT
Start: 2020-02-01 | End: 2020-02-01 | Stop reason: SURG

## 2020-02-01 RX ORDER — DEXTROSE MONOHYDRATE, SODIUM CHLORIDE, AND POTASSIUM CHLORIDE 50; 1.49; 4.5 G/1000ML; G/1000ML; G/1000ML
100 INJECTION, SOLUTION INTRAVENOUS CONTINUOUS
Status: DISCONTINUED | OUTPATIENT
Start: 2020-02-01 | End: 2020-02-02 | Stop reason: HOSPADM

## 2020-02-01 RX ORDER — HEPARIN SODIUM 5000 [USP'U]/ML
5000 INJECTION, SOLUTION INTRAVENOUS; SUBCUTANEOUS EVERY 8 HOURS SCHEDULED
Status: DISCONTINUED | OUTPATIENT
Start: 2020-02-02 | End: 2020-02-02 | Stop reason: HOSPADM

## 2020-02-01 RX ORDER — CEFOXITIN 2 G/1
INJECTION, POWDER, FOR SOLUTION INTRAVENOUS
Status: DISCONTINUED
Start: 2020-02-01 | End: 2020-02-02 | Stop reason: HOSPADM

## 2020-02-01 RX ORDER — PROMETHAZINE HYDROCHLORIDE 25 MG/ML
12.5 INJECTION, SOLUTION INTRAMUSCULAR; INTRAVENOUS ONCE AS NEEDED
Status: DISCONTINUED | OUTPATIENT
Start: 2020-02-01 | End: 2020-02-01 | Stop reason: HOSPADM

## 2020-02-01 RX ADMIN — ONDANSETRON 4 MG: 2 INJECTION INTRAMUSCULAR; INTRAVENOUS at 19:59

## 2020-02-01 RX ADMIN — IOPAMIDOL 95 ML: 755 INJECTION, SOLUTION INTRAVENOUS at 09:50

## 2020-02-01 RX ADMIN — HYDROMORPHONE HYDROCHLORIDE 0.5 MG: 1 INJECTION, SOLUTION INTRAMUSCULAR; INTRAVENOUS; SUBCUTANEOUS at 21:27

## 2020-02-01 RX ADMIN — PROPOFOL 140 MG: 10 INJECTION, EMULSION INTRAVENOUS at 18:40

## 2020-02-01 RX ADMIN — Medication 50 MCG: at 21:02

## 2020-02-01 RX ADMIN — DIPHENHYDRAMINE HYDROCHLORIDE 25 MG: 50 INJECTION INTRAMUSCULAR; INTRAVENOUS at 17:31

## 2020-02-01 RX ADMIN — FENTANYL CITRATE 50 MCG: 0.05 INJECTION, SOLUTION INTRAMUSCULAR; INTRAVENOUS at 19:19

## 2020-02-01 RX ADMIN — HYDROMORPHONE HYDROCHLORIDE 0.5 MG: 1 INJECTION, SOLUTION INTRAMUSCULAR; INTRAVENOUS; SUBCUTANEOUS at 20:37

## 2020-02-01 RX ADMIN — INDOCYANINE GREEN AND WATER 7.5 MG: KIT at 19:18

## 2020-02-01 RX ADMIN — Medication 10 MG: at 18:40

## 2020-02-01 RX ADMIN — SODIUM CHLORIDE, POTASSIUM CHLORIDE, SODIUM LACTATE AND CALCIUM CHLORIDE 100 ML/HR: 600; 310; 30; 20 INJECTION, SOLUTION INTRAVENOUS at 17:31

## 2020-02-01 RX ADMIN — FENTANYL CITRATE 50 MCG: 0.05 INJECTION, SOLUTION INTRAMUSCULAR; INTRAVENOUS at 18:40

## 2020-02-01 RX ADMIN — MORPHINE SULFATE 4 MG: 4 INJECTION, SOLUTION INTRAMUSCULAR; INTRAVENOUS at 21:47

## 2020-02-01 RX ADMIN — ONDANSETRON 4 MG: 2 INJECTION INTRAMUSCULAR; INTRAVENOUS at 09:14

## 2020-02-01 RX ADMIN — KETOROLAC TROMETHAMINE 30 MG: 30 INJECTION, SOLUTION INTRAMUSCULAR; INTRAVENOUS at 21:08

## 2020-02-01 RX ADMIN — DEXAMETHASONE SODIUM PHOSPHATE 4 MG: 4 INJECTION, SOLUTION INTRAMUSCULAR; INTRAVENOUS at 19:20

## 2020-02-01 RX ADMIN — Medication 50 MCG: at 20:24

## 2020-02-01 RX ADMIN — ONDANSETRON HYDROCHLORIDE 4 MG: 2 INJECTION, SOLUTION INTRAVENOUS at 17:31

## 2020-02-01 RX ADMIN — SODIUM CHLORIDE 1000 ML: 9 INJECTION, SOLUTION INTRAVENOUS at 09:14

## 2020-02-01 RX ADMIN — KETOROLAC TROMETHAMINE 30 MG: 30 INJECTION, SOLUTION INTRAMUSCULAR at 21:08

## 2020-02-01 RX ADMIN — LIDOCAINE HYDROCHLORIDE 80 MG: 20 INJECTION, SOLUTION EPIDURAL; INFILTRATION; INTRACAUDAL; PERINEURAL at 18:40

## 2020-02-01 RX ADMIN — FENTANYL CITRATE 50 MCG: 0.05 INJECTION, SOLUTION INTRAMUSCULAR; INTRAVENOUS at 21:02

## 2020-02-01 RX ADMIN — HYDROMORPHONE HYDROCHLORIDE 0.5 MG: 1 INJECTION, SOLUTION INTRAMUSCULAR; INTRAVENOUS; SUBCUTANEOUS at 20:52

## 2020-02-01 RX ADMIN — POTASSIUM CHLORIDE, DEXTROSE MONOHYDRATE AND SODIUM CHLORIDE 100 ML/HR: 150; 5; 450 INJECTION, SOLUTION INTRAVENOUS at 21:53

## 2020-02-01 RX ADMIN — CEFOXITIN 2000 MG: 2 INJECTION, POWDER, FOR SOLUTION INTRAVENOUS at 18:49

## 2020-02-01 RX ADMIN — MORPHINE SULFATE 4 MG: 4 INJECTION, SOLUTION INTRAMUSCULAR; INTRAVENOUS at 09:15

## 2020-02-01 RX ADMIN — FENTANYL CITRATE 50 MCG: 0.05 INJECTION, SOLUTION INTRAMUSCULAR; INTRAVENOUS at 20:24

## 2020-02-01 RX ADMIN — ONDANSETRON 4 MG: 2 INJECTION INTRAMUSCULAR; INTRAVENOUS at 17:31

## 2020-02-01 RX ADMIN — Medication 30 MG: at 18:41

## 2020-02-01 RX ADMIN — SUGAMMADEX 129.4 MG: 100 INJECTION, SOLUTION INTRAVENOUS at 19:55

## 2020-02-01 RX ADMIN — FENTANYL CITRATE 50 MCG: 0.05 INJECTION, SOLUTION INTRAMUSCULAR; INTRAVENOUS at 18:59

## 2020-02-01 ASSESSMENT — ACTIVITIES OF DAILY LIVING (ADL)
PATIENT'S MEMORY ADEQUATE TO SAFELY COMPLETE DAILY ACTIVITIES?: YES
ADEQUATE_TO_COMPLETE_ADL: YES

## 2020-02-01 ASSESSMENT — PAIN DESCRIPTION - DESCRIPTORS: DESCRIPTORS: SHARP

## 2020-02-01 NOTE — ANESTHESIA PREPROCEDURE EVALUATION
Pre-Procedure Assessment    Patient: Kenya Lopez, female, 34 y.o.    Ht Readings from Last 1 Encounters:   02/01/20 1.524 m (5')     Wt Readings from Last 1 Encounters:   02/01/20 64.7 kg (142 lb 10.2 oz)       Last Vitals  BP (!) 103/38 (02/01/20 1522)    Temp      Pulse 57 (02/01/20 1522)   Resp 16 (02/01/20 1522)    SpO2 94 % (02/01/20 1522)    Pain Score 0 - No pain (02/01/20 1522)       Problem list reviewed and Medical history reviewed           Airway   Mallampati: II  TM distance: >3 FB  Neck ROM: full      Dental      Pulmonary - negative ROS    breath sounds clear to auscultation  Cardiovascular   (+) valvular problems/murmurs,     Rhythm: regular  Rate: normal    Mental Status/Neuro/Psych - negative ROS   Pt is alert.        GI/Hepatic/Renal - negative ROS     Endo/Other - negative ROS   Abdominal           Social History     Tobacco Use   • Smoking status: Current Every Day Smoker     Packs/day: 0.50     Types: Cigarettes   • Smokeless tobacco: Never Used   • Tobacco comment: 5 cigarettes daily   Substance Use Topics   • Alcohol use: Not Currently     Comment: rarely      Hematology   WBC   Date Value Ref Range Status   02/01/2020 11.0 (H) 4.5 - 10.5 10*3/uL Final     RBC   Date Value Ref Range Status   02/01/2020 5.07 3.70 - 5.30 10*6/µL Final     MCV   Date Value Ref Range Status   02/01/2020 87.5 81.0 - 97.0 fL Final     Hemoglobin   Date Value Ref Range Status   02/01/2020 15.3 11.5 - 15.5 g/dL Final     Hematocrit   Date Value Ref Range Status   02/01/2020 44.3 34.0 - 45.0 % Final     Platelets   Date Value Ref Range Status   02/01/2020 237 140 - 350 10*3/uL Final      Coagulation No results found for: PT, APTT, INR   General Chemistry   Calcium   Date Value Ref Range Status   02/01/2020 9.6 8.6 - 10.3 mg/dL Final     BUN   Date Value Ref Range Status   02/01/2020 7 7 - 25 mg/dL Final     Creatinine   Date Value Ref Range Status   02/01/2020 0.65 0.60 - 1.20 mg/dL Final     Glucose   Date Value  Ref Range Status   02/01/2020 88 70 - 105 mg/dL Final     Sodium   Date Value Ref Range Status   02/01/2020 140 135 - 145 mmol/L Final     Potassium   Date Value Ref Range Status   02/01/2020 3.9 3.5 - 5.1 mmol/L Final     Magnesium   Date Value Ref Range Status   02/01/2020 2.1 1.8 - 2.4 mg/dL Final     CO2   Date Value Ref Range Status   02/01/2020 27 21 - 32 mmol/L Final     Chloride   Date Value Ref Range Status   02/01/2020 102 98 - 107 mmol/L Final     Anesthesia Plan    ASA 2   NPO status reviewed: > 6 hours    General         Induction: intravenous   Airway Planning: oral ET tube          Plan for postoperative opioid use.    Anesthetic plan and risks discussed with patient.  Use of blood products discussed with patient who consented to blood products.

## 2020-02-01 NOTE — MEDICATION HISTORY SPECIALIST NOTES
[facility] [id number]    CSN: [id number]  : [id number]    Information sources:  EPIC  Complete Dispense Report    Allergies verified.    Patient interviewed in ED who provided all history. Patient verified medications and provided last doses. Verified with Complete Dispense Report.    Discontinued medications:  Metoprolol- patient reports allergic reaction while pregnant.  Several vitamins/OTC's patient report she is no longer taking.     Profile was checked for  medications.     Mirena placed 2018.

## 2020-02-01 NOTE — ED PROVIDER NOTES
Abdominal Pain (sharp upper abd pain radiating into back after eating.  n/v x4 since last night)        HPI:  This is a 34-year-old female presenting to the emergency department with complaints of abdominal pain.    Describes it to be a sharp stabbing type pain.  This initially started in August and has been periodically present since that time.  With previous episodes, the patient states that she has an intense achy type discomfort in her right upper quadrant and epigastrium.  It radiates to her back.  Symptoms last approximately 2-1/2 hours and seemed to be relieved with an episode of vomiting.  She is had numerous episodes over the last 5 months.    Pain started again last night, however her discomfort has not improved.  She has had multiple episodes of vomiting.  She is with continued discomfort.    Past Medical History:   Diagnosis Date   • Fallot tetralogy    • Ventricular septal defect        Past Surgical History:   Procedure Laterality Date   • CARDIAC SURGERY     • TONSILLECTOMY         Social History     Socioeconomic History   • Marital status: Single     Spouse name: Not on file   • Number of children: Not on file   • Years of education: Not on file   • Highest education level: Not on file   Occupational History   • Not on file   Social Needs   • Financial resource strain: Not on file   • Food insecurity     Worry: Not on file     Inability: Not on file   • Transportation needs     Medical: Not on file     Non-medical: Not on file   Tobacco Use   • Smoking status: Current Every Day Smoker     Packs/day: 0.50     Types: Cigarettes   • Smokeless tobacco: Never Used   • Tobacco comment: 5 cigarettes daily   Substance and Sexual Activity   • Alcohol use: Not Currently     Comment: rarely   • Drug use: No   • Sexual activity: Defer   Lifestyle   • Physical activity     Days per week: Not on file     Minutes per session: Not on file   • Stress: Not on file   Relationships   • Social connections     Talks on  phone: Not on file     Gets together: Not on file     Attends Religion service: Not on file     Active member of club or organization: Not on file     Attends meetings of clubs or organizations: Not on file     Relationship status: Not on file   • Intimate partner violence     Fear of current or ex partner: Not on file     Emotionally abused: Not on file     Physically abused: Not on file     Forced sexual activity: Not on file   Other Topics Concern   • Not on file   Social History Narrative   • Not on file       History reviewed. No pertinent family history.    No Known Allergies      Current Outpatient Medications:   •  sertraline (ZOLOFT) 50 mg tablet, Take 1 tablet (50 mg total) by mouth daily, Disp: 30 tablet, Rfl: 2  •  metoprolol tartrate (LOPRESSOR) 25 mg tablet, Take 12.5 mg by mouth, Disp: , Rfl:   •  levonorgestrel (Mirena) 20 mcg/24 hours (5 yrs) 52 mg IUD, 1 Device by intrauterine route Every 5 (five) years, Disp: , Rfl:   •  d-methorphan/PE/acetaminophen (ROBITUSSIN COLD-FLU DAY ORAL), Take by mouth, Disp: , Rfl:   •  guaiFENesin (Mucinex) 600 mg 12 hr tablet, Take 1,200 mg by mouth 2 (two) times a day, Disp: , Rfl:   •  PNV no.95/ferrous fum/folic ac (PRENATAL ORAL), Take by mouth, Disp: , Rfl:   •  ranitidine HCl (ZANTAC ORAL), Take by mouth, Disp: , Rfl:       ROS:  Constitutional: negative for fever  Eyes: negative for visual changes  ENT: negative for sore throat  Cardiovascular: negative for chest pain  Respiratory: negative for shortness of breath   GI: Positive for abdominal pain  : negative for hematuria  Musculoskeletal: Positive for back pain  Neuro: negative for headache  Hematology: negative for bleeding  Immunology: negative for joint pain      ED Triage Vitals   Temp Heart Rate Resp BP SpO2   02/01/20 0718 02/01/20 0718 02/01/20 0718 02/01/20 0718 02/01/20 0718   36.6 °C (97.9 °F) 70 18 99/54 98 %      Temp Source Heart Rate Source Patient Position BP Location FiO2 (%)   02/01/20  0718 -- 02/01/20 1016 -- --   Oral  Head of bed 30 degrees or higher           Physical Exam:  Nursing note and vitals reviewed.  Constitutional: Comfortable but nontoxic-appearing female.  She answers questions appropriately.  Head: Normocephalic and atraumatic. Mucous membranes are moist.   Eyes: Pupils are equal, round, and reactive to light.   Neck: Supple.  Cardiovascular: Regular rate and rhythm without murmur.   Pulmonary/Chest: No respiratory distress.  Clear to auscultation bilaterally.  Abdominal: Moderate tenderness in the right upper quadrant and epigastrium.  Minimal rebound.  No guarding..    Back: No CVA tenderness. No midline tenderness.   Musculoskeletal: No edema  Neurological: Alert.   Skin: Skin is warm and dry. No rash noted.   Psychiatric: Normal mood and affect.        Labs:  Labs Reviewed   CBC WITH AUTO DIFFERENTIAL - Abnormal       Result Value    WBC 11.0 (*)     RBC 5.07      Hemoglobin 15.3      Hematocrit 44.3      MCV 87.5      MCH 30.1      MCHC 34.4      RDW 13.6      Platelets 237      MPV 7.1      Neutrophils% 69      Lymphocytes% 20      Monocytes% 8      Eosinophils% 2      Basophils% 1      Neutrophils Absolute 7.60      Lymphocytes Absolute 2.20      Monocytes Absolute 0.90      Eosinophils Absolute 0.30      Basophils Absolute 0.10      Slide Review for Plt Clumps None Seen     COMPREHENSIVE METABOLIC PANEL - Abnormal    Sodium 140      Potassium 3.9      Chloride 102      CO2 27      Anion Gap 11      BUN 7      Creatinine 0.65      Glucose 88      Calcium 9.6       (*)     ALT (SGPT) 247 (*)     Alkaline Phosphatase 80      Total Protein 7.0      Albumin 4.4      Total Bilirubin 0.79      eGFR 115      Corrected Calcium 9.3      Narrative:     Estimated GFR calculated using the 2009 CKD-EPI creatinine equation.   URINALYSIS, MICROSCOPIC ONLY - Abnormal    RBC, Urine 3-5 (*)     WBC, Urine 0-4      Squamous Epithelial, Urine 0-4      Bacteria, Urine Few      Amorphous  Crystals, Urine Present (*)     Mucus, Urine Present     URINALYSIS, DIPSTICK ONLY, FOR USE WITH MICROSCOPIC PANEL - Abnormal    Color, Urine Tiny (*)     Clarity, Urine Clear      Specific Gravity, Urine 1.025      Leukocytes, Urine Negative      Nitrite, Urine Negative      Protein, Urine 100  (*)     Ketones, Urine Negative      Urobilinogen, Urine 2.0 (*)     Bilirubin, Urine Small (*)     Blood, Urine Negative      Glucose, Urine Negative      pH, Urine 8.0      Narrative:     CONFIRMATORY URINE TESTING (ICTOTEST) UNAVAILABLE, RECOMMEND SERUM BILIRUBIN IF INDICATED.   LIPASE - Normal    Lipase 21     MAGNESIUM - Normal    Magnesium 2.1     URINALYSIS WITH MICROSCOPIC    Narrative:     The following orders were created for panel order Urinalysis w/microscopic Urine, Clean Catch.  Procedure                               Abnormality         Status                     ---------                               -----------         ------                     Urinalysis, microscopic U...[[id number]]  Abnormal            Final result               Urinalysis, dipstick Urin...[[id number]]  Abnormal            Final result                 Please view results for these tests on the individual orders.       Imaging:  CT ABDOMEN PELVIS W IV CONTRAST No Oral Contrast   Final Result   IMPRESSION:      1. Gallbladder is moderately distended with questionable wall thickening. Consider ultrasound correlation to exclude inflammation.   2. A portion of the patient's IUD appears to have perforated the anterior myometrium. This appears to be a change since previous. No other acute findings in the pelvis.          MDM:   This is a 34-year-old female presenting to the emergency department with right upper quadrant pain.  CT shows evidence of cholecystitis and the patient has a mild elevation in her white count along with an acute elevation in her transaminases.  Alkaline phosphatase and total bili are normal.  Patient wishes to have her  gallbladder taken out.  She has been n.p.o. for several hours.  Case was discussed with surgery and ultimately the patient will have a cholecystectomy.      Given   Medications   ondansetron (ZOFRAN) injection 4 mg (4 mg intravenous Given 2/1/20 0914)   sodium chloride 0.9 % bolus 1,000 mL (1,000 mL intravenous New Bag/New Syringe 2/1/20 0914)   morphine injection 4 mg (4 mg intravenous Given 2/1/20 0915)   iopamidol (ISOVUE-370) 76 % injection 95 mL (95 mL intravenous Given 2/1/20 0950)         Procedure    Procedures        Clinical Impression:    Final diagnoses:   [R10.11] Right upper quadrant abdominal pain   [K81.9] Cholecystitis   [D72.829] Leukocytosis   [R74.0] Elevated transaminase level   [K80.50] Biliary colic   [R11.10] Vomiting   [E86.0] Dehydration   Hypotension    I, Marty Goldman MD, personally performed the services described in this documentation as transcribed by an emergency department scribe, in my presence, and it is both accurate and complete.      Marty Goldman MD  02/01/20 0865

## 2020-02-02 VITALS
RESPIRATION RATE: 20 BRPM | HEART RATE: 65 BPM | OXYGEN SATURATION: 92 % | TEMPERATURE: 98.2 F | SYSTOLIC BLOOD PRESSURE: 98 MMHG | WEIGHT: 142.64 LBS | DIASTOLIC BLOOD PRESSURE: 53 MMHG | BODY MASS INDEX: 28 KG/M2 | HEIGHT: 60 IN

## 2020-02-02 LAB
ALBUMIN SERPL-MCNC: 3.7 G/DL (ref 3.5–5.3)
ALP SERPL-CCNC: 89 U/L (ref 37–98)
ALT SERPL-CCNC: 186 U/L (ref 0–52)
ANION GAP SERPL CALC-SCNC: 10 MMOL/L (ref 3–11)
AST SERPL-CCNC: 72 U/L (ref 0–39)
BASOPHILS # BLD AUTO: 0 10*3/UL
BASOPHILS NFR BLD AUTO: 0 % (ref 0–2)
BILIRUB SERPL-MCNC: 0.42 MG/DL (ref 0–1.4)
BUN SERPL-MCNC: 9 MG/DL (ref 7–25)
CALCIUM ALBUM COR SERPL-MCNC: 8.6 MG/DL (ref 8.6–10.3)
CALCIUM SERPL-MCNC: 8.4 MG/DL (ref 8.6–10.3)
CHLORIDE SERPL-SCNC: 105 MMOL/L (ref 98–107)
CO2 SERPL-SCNC: 23 MMOL/L (ref 21–32)
CREAT SERPL-MCNC: 0.63 MG/DL (ref 0.6–1.2)
EOSINOPHIL # BLD AUTO: 0 10*3/UL
EOSINOPHIL NFR BLD AUTO: 0 % (ref 0–3)
ERYTHROCYTE [DISTWIDTH] IN BLOOD BY AUTOMATED COUNT: 13.6 % (ref 11.5–14)
GFR SERPL CREATININE-BSD FRML MDRD: 116 ML/MIN/1.73M*2
GLUCOSE SERPL-MCNC: 202 MG/DL (ref 70–105)
HCT VFR BLD AUTO: 39.8 % (ref 34–45)
HGB BLD-MCNC: 13.4 G/DL (ref 11.5–15.5)
LYMPHOCYTES # BLD AUTO: 1.2 10*3/UL
LYMPHOCYTES NFR BLD AUTO: 17 % (ref 11–47)
MCH RBC QN AUTO: 30.6 PG (ref 28–33)
MCHC RBC AUTO-ENTMCNC: 33.6 G/DL (ref 32–36)
MCV RBC AUTO: 90.9 FL (ref 81–97)
MONOCYTES # BLD AUTO: 0.4 10*3/UL
MONOCYTES NFR BLD AUTO: 5 % (ref 3–11)
NEUTROPHILS # BLD AUTO: 5.8 10*3/UL
NEUTROPHILS NFR BLD AUTO: 78 % (ref 41–81)
PLATELET # BLD AUTO: 279 10*3/UL (ref 140–350)
PMV BLD AUTO: 7 FL (ref 6.9–10.8)
POTASSIUM SERPL-SCNC: 4 MMOL/L (ref 3.5–5.1)
PROT SERPL-MCNC: 6 G/DL (ref 6–8.3)
RBC # BLD AUTO: 4.37 10*6/ΜL (ref 3.7–5.3)
SODIUM SERPL-SCNC: 138 MMOL/L (ref 135–145)
WBC # BLD AUTO: 7.5 10*3/UL (ref 4.5–10.5)

## 2020-02-02 PROCEDURE — 2580000300 HC RX 258: Performed by: SURGERY

## 2020-02-02 PROCEDURE — 2590000100 HC RX 259: Performed by: SURGERY

## 2020-02-02 PROCEDURE — 85025 COMPLETE CBC W/AUTO DIFF WBC: CPT | Performed by: SURGERY

## 2020-02-02 PROCEDURE — G0378 HOSPITAL OBSERVATION PER HR: HCPCS

## 2020-02-02 PROCEDURE — 96376 TX/PRO/DX INJ SAME DRUG ADON: CPT

## 2020-02-02 PROCEDURE — 96372 THER/PROPH/DIAG INJ SC/IM: CPT

## 2020-02-02 PROCEDURE — 6370000100 HC RX 637 (ALT 250 FOR IP): Performed by: SURGERY

## 2020-02-02 PROCEDURE — 36415 COLL VENOUS BLD VENIPUNCTURE: CPT | Performed by: SURGERY

## 2020-02-02 PROCEDURE — 80053 COMPREHEN METABOLIC PANEL: CPT | Performed by: SURGERY

## 2020-02-02 PROCEDURE — 6360000200 HC RX 636 W HCPCS (ALT 250 FOR IP): Performed by: SURGERY

## 2020-02-02 RX ADMIN — POTASSIUM CHLORIDE, DEXTROSE MONOHYDRATE AND SODIUM CHLORIDE 100 ML/HR: 150; 5; 450 INJECTION, SOLUTION INTRAVENOUS at 08:49

## 2020-02-02 RX ADMIN — ACETAMINOPHEN 650 MG: 325 TABLET ORAL at 04:29

## 2020-02-02 RX ADMIN — HEPARIN SODIUM 5000 UNITS: 5000 INJECTION INTRAVENOUS; SUBCUTANEOUS at 06:13

## 2020-02-02 RX ADMIN — KETOROLAC TROMETHAMINE 30 MG: 30 INJECTION, SOLUTION INTRAMUSCULAR at 08:06

## 2020-02-02 RX ADMIN — DOCUSATE SODIUM 100 MG: 100 CAPSULE, LIQUID FILLED ORAL at 08:06

## 2020-02-02 RX ADMIN — KETOROLAC TROMETHAMINE 30 MG: 30 INJECTION, SOLUTION INTRAMUSCULAR at 02:41

## 2020-02-02 RX ADMIN — OXYCODONE HYDROCHLORIDE 5 MG: 5 TABLET ORAL at 00:13

## 2020-02-02 RX ADMIN — MORPHINE SULFATE 4 MG: 4 INJECTION, SOLUTION INTRAMUSCULAR; INTRAVENOUS at 06:57

## 2020-02-02 RX ADMIN — OXYCODONE HYDROCHLORIDE 5 MG: 5 TABLET ORAL at 04:30

## 2020-02-02 RX ADMIN — SERTRALINE HYDROCHLORIDE 50 MG: 50 TABLET ORAL at 08:06

## 2020-02-02 NOTE — OP NOTE
Gyn Operative Note    Name: Kenya Lopez     Date: 02/01/20    Pre-op Diagnosis     * Right upper quadrant abdominal pain [R10.11]    Surgeon:  Rigoberto Tong MD    Pre-op Diagnosis: IUD perforation of uterus                                Post-op Diagnosis: Same    Surgeon(s):    Rigoberto Tong MD    First Assist: Oskar Duran; David Esparza    Anesthesiologist: JERRY Laguna MD  CRNA: Morgan Herman CRNA       Anesthesia:   General     EBL: No blood loss documented.      Specimens:  Order Name Source Comment Collection Info Order Time   PATHOLOGY SPECIMEN (HISTOLOGY) Gallbladder  Collected By: Edelmira Hodges MD 2/1/2020  7:39 PM   PATHOLOGY SPECIMEN (HISTOLOGY) Vagina  Collected By: Den Mcfarland MD 2/1/2020  7:55 PM       History: Dr. Tong consulted by Dr. Hodges for IUD perforation.  This was found at the time of cholecystectomy.  There appears to be evidence of one arm of IUD perforating through the myometrium and visually identified under the serosa of the uterus.  The IUD was placed approximately 6 weeks postpartum in December 2019.    Findings: IUD strings identified in the vagina.  Laparoscopic picture reviewed confirming uterine perforation with IUD.    Complications: None    DESCRIPTION OF PROCEDURE  After arrival in the operating room and confirming patient identification, pictures were reviewed.  After completion of the cholecystectomy, the patient was placed in dorsolithotomy position and Graves speculum inserted into the vagina.  IUD strings were visualized emanating from the cervical os and they were grasped with ring forceps.  The IUD was removed without difficulty.  It was submitted to pathology for gross identification only.  Speculum removed from the vagina.    Rigoberto Tong MD

## 2020-02-02 NOTE — PLAN OF CARE
Problem: Gastrointestinal - Adult  Goal: Minimal or absence of nausea and vomiting  Description: INTERVENTIONS:  1. Ensure adequate hydration  2. Monitor intake and output  3. Maintain NPO status until nausea and vomiting are resolved  4. Nasogastric tube to low intermittent suction as ordered  5. Administer ordered antiemetic medications as needed  6. Provide nonpharmacologic comfort measures as appropriate  7. Advance diet as ordered  8. Nutrition consult as indicated   Outcome: Progressing  Goal: Maintains or returns to baseline digestive function  Description: INTERVENTIONS:  1. Assess bowel function  2. Ensure adequate hydration  3. Administer ordered medications as needed  4. Encourage mobilization and activity  5. Nutrition consult as indicated  6. Assess hydration and nutritional status  7. Assess characteristics and frequency of stool  8. Monitor for metabolic panel imbalances  9. Assess for treatment effectiveness  Outcome: Progressing  Goal: Maintains adequate nutritional intake  Description: INTERVENTIONS:  1. Monitor percentage of each meal consumed  2. Identify factors contributing to decreased intake, treat as appropriate  3. Assist with meals as needed  4. Monitor I&O, weight and lab values  5. Obtain nutritional consult as indicated  6. Administer alternative nutrition interventions as ordered  Outcome: Progressing  Goal: Nutrient intake appropriate for improving, restoring or maintaining nutritional needs  Description: INTERVENTIONS:  1. Assess nutritional status  2. Monitor oral intake, labs, and treatment plans  3. Provide appropriate diets, oral nutritional supplements, and vitamin/mineral supplements  4. Monitor, and adjust tube feedings and TPN/PPN based on assessed needs  5. Provide specific nutrition education as appropriate  Outcome: Progressing     Problem: Skin/Tissue Integrity - Adult  Goal: Skin integrity remains intact  Description: INTERVENTIONS  1. Assess and document risk factors  for pressure ulcer development  2. Assess and document skin integrity  3. Monitor for areas of redness and/or skin breakdown  4. Initiate pressure ulcer prevention measures as indicated  Outcome: Progressing  Goal: Incisions, wounds, or drain sites healing without S/S of infection  Description: INTERVENTIONS  1. Assess and document risk factors for skin breakdown  2. Assess and document skin integrity  3. Assess and document dressing/incision, wound bed, drain sites and surrounding tissue  4. Implement wound care per orders  Outcome: Progressing     Problem: Pain - Adult  Goal: Verbalizes/displays adequate comfort level or baseline comfort level  Description: INTERVENTIONS:  1. Encourage patient to monitor pain and request interventions  2. Assess pain using the appropriate pain scale  3. Administer analgesics based on type and severity of pain and evaluate response  4. Educate/Implement non-pharmacological measures as appropriate and evaluate response  5. Consider cultural, developmental and social influences on pain and pain management  6. Notify Provider if interventions unsuccessful or patient reports new pain  Outcome: Progressing     Problem: Infection - Adult  Goal: Absence of infection during hospitalization  Description: INTERVENTIONS:  1. Assess and monitor for signs and symptoms of infection  2. Monitor lab/diagnostic results  3. Monitor all insertion sites/wounds/incisions  4. Monitor secretions for changes in amount and color  5. Administer medications as ordered  6. Educate and encourage patient and family to use good hand hygiene technique  7. Identify and educate in appropriate isolation precautions for identified infection/condition  Outcome: Progressing     Problem: Safety Adult  Goal: Patient will remain safe during hospitalization  Description: INTERVENTIONS    1. Assess patient for fall risk and implement interventions if needed  2. Use safe transport techniques  3. Assess patient using the  appropriate Ludin skin assessment scale  4. Assess patient for risk of aspiration  5. Assess patient for risk of elopement  6. Assess patient for risk of suicide  Outcome: Progressing     Problem: Daily Care  Goal: Daily care needs are met  Description: INTERVENTIONS:   1. Assess and monitor skin integrity  2. Identify patients at risk for skin breakdown on admission and per policy  3. Assess and monitor ability to perform self care and identify potential discharge needs  4. Assess skin integrity/risk for skin breakdown  5. Assist patient with activities of daily living as needed  6. Encourage independent activity per ability   7. Provide mouth care   8. Include patient/family/caregiver in decisions related to daily care   Outcome: Progressing     Problem: Knowledge Deficit  Goal: Patient/family/caregiver demonstrates understanding of disease process, treatment plan, medications, and discharge instructions  Description: INTERVENTIONS:   1. Complete learning assessment and assess knowledge base  2. Provide teaching at level of understanding   3. Provide teaching via preferred learning methods  Outcome: Progressing     Problem: Discharge Barriers  Goal: Patient's discharge needs are met  Description: INTERVENTIONS:  1. Assess patient for self-management skills  2. Encourage participation in management  3. Identify potential discharge barriers on admission and throughout hospital stay  4. Involve patient/family/caregiver in discharge planning process  5. Collaborate with case management/ for discharge needs  Outcome: Progressing

## 2020-02-02 NOTE — H&P
General Surgical History and Physical  Dr. Edelmira Hodges  [facility]    Patients name: Kenya Lopez  Patients : 1985  Medical record number: [id number]      Patient Active Problem List   Diagnosis   • Lower abdominal pain   • Contraceptive use education   • Fallot tetralogy   • Ventricular septal defect         Acute cholecystitis    Subjective     Patient is a 34 y.o. female presents with acute on chronic cholecystitis, dating back to August and episodically present ever since.  Pain has been severe, but usually went away after vomiting.  Pain did not go away today, and she presented to the emergency department.  Patient describes pain as right upper quadrant radiating to her back, occasionally radiating up to both shoulders, associated with nausea, slightly improved by vomiting.  She specifically denies episodes of jaundice, dark urine, pale stool.  She denies any intra-abdominal surgeries.    Patient does have history of tetralogy of Fallot, status post multiple cardiac surgeries for ventricular septal defect repair.  She also has history of tonsillectomy.  She had a spontaneous vaginal delivery about 2 months ago followed by a IUD placement.  She denies any chronic medications except for depression.    On examination here patient was found to have a CT scan demonstrating acute cholecystitis with mild leukocytosis of 11, elevated AST and ALT but normal bilirubin.  Of note her intrauterine device was observed to be slightly malpositioned on the CT of the abdomen pelvis.  Patient notes no symptoms from this, no vaginal bleeding.    .     The following portions of the patient's history were reviewed and updated as appropriate: allergies, current medications, past family history, past medical history, past social history, past surgical history and problem list.    Review of Systems  Constitutional: negative negative for jaundice, unintentional weight loss  Eyes: negative  Ears, nose, mouth, throat, and  face: negative  Respiratory: negative for shortness of breath  Cardiovascular: negative for chest pain  Gastrointestinal: Positive for right upper quadrant pain, nausea, vomiting  Genitourinary:negative  Integument/breast: negative  Hematologic/lymphatic: negative  Musculoskeletal:negative  Neurological: negative  Behavioral/Psych: negative  Endocrine: negative  Allergic/Immunologic: negative    Patient History:  Medical History:   Past Medical History:   Diagnosis Date   • Fallot tetralogy    • Ventricular septal defect      Surgical History:   Past Surgical History:   Procedure Laterality Date   • CARDIAC SURGERY     • TONSILLECTOMY       Family History: History reviewed. No pertinent family history.  Social History:   Social History     Socioeconomic History   • Marital status: Single     Spouse name: Not on file   • Number of children: Not on file   • Years of education: Not on file   • Highest education level: Not on file   Occupational History   • Not on file   Social Needs   • Financial resource strain: Not on file   • Food insecurity     Worry: Not on file     Inability: Not on file   • Transportation needs     Medical: Not on file     Non-medical: Not on file   Tobacco Use   • Smoking status: Current Every Day Smoker     Packs/day: 0.50     Types: Cigarettes   • Smokeless tobacco: Never Used   • Tobacco comment: 5 cigarettes daily   Substance and Sexual Activity   • Alcohol use: Not Currently     Comment: rarely   • Drug use: No   • Sexual activity: Defer   Lifestyle   • Physical activity     Days per week: Not on file     Minutes per session: Not on file   • Stress: Not on file   Relationships   • Social connections     Talks on phone: Not on file     Gets together: Not on file     Attends [employer] service: Not on file     Active member of club or organization: Not on file     Attends meetings of clubs or organizations: Not on file     Relationship status: Not on file   • Intimate partner violence      Fear of current or ex partner: Not on file     Emotionally abused: Not on file     Physically abused: Not on file     Forced sexual activity: Not on file   Other Topics Concern   • Not on file   Social History Narrative   • Not on file     Allergies: No Known Allergies    Medications:   Current Facility-Administered Medications:   •  cefOXitin (MEFOXIN) 2,000 mg in normal saline 50 mL IVPB - MBP, 2,000 mg, intravenous, Once, Den Mcfarland MD  •  sodium chloride flush 2 mL, 2 mL, intravenous, q8h FELIX, JERRY Laguna MD  •  sodium chloride flush 2 mL, 2 mL, intravenous, PRN, JERRY Laguna MD  •  LR infusion, 100 mL/hr, intravenous, Continuous, JERRY Laguna MD, Last Rate: 100 mL/hr at 02/01/20 1731, 100 mL/hr at 02/01/20 1731  •  midazolam (VERSED) injection 1 mg, 1 mg, intravenous, q5 min PRN, JERRY Laguna MD  •  cefOXitin (MEFOXIN) injection, , , ,   •  normal saline IVPB, , , ,     Objective:  Objective     Temp:  [36.4 °C (97.5 °F)-36.6 °C (97.9 °F)] 36.4 °C (97.5 °F)  Heart Rate:  [52-70] 52  Resp:  [16-20] 16  BP: ()/(38-65) 104/56  No intake/output data recorded.  I/O this shift:  In: 1000 [I.V.:1000]  Out: -     Exam:  General Appearance:    Alert, cooperative, no distress, appears stated age    Head:    Normocephalic, without obvious abnormality, atraumatic   Eyes:    PERRL, conjunctiva/corneas clear, EOM's intact, both eyes   Ears:    Normal TM's and external ear canals, both ears   Nose:   Nares normal, septum midline, mucosa normal, no drainage   Throat:   Lips, mucosa, and tongue normal; teeth and gums normal   Neck:   Supple, symmetrical, trachea midline, no adenopathy;     thyroid:  no enlargement/tenderness/nodules   Back:     Symmetric, no curvature, ROM normal, no CVA tenderness   Lungs:     Clear to auscultation bilaterally, respirations unlabored   Chest Wall:    No tenderness or deformity   Heart:    Regular rate and rhythm, S1 and S2 normal,  no murmur, rub or gallop   Abdomen:    Soft, tender to palpation right upper quadrant negative Perez's   Genitalia:    Normal  without lesion, discharge   Extremities:   Extremities normal, atraumatic, no cyanosis or edema   Pulses:   2+ and symmetric all extremities   Skin:   Skin color, texture, turgor normal, no rashes or lesions   Lymph nodes:   Cervical, supraclavicular, and axillary nodes normal   Neurologic:   CNII-XII intact, normal strength, sensation and reflexes     throughout    Lab and image review:  Data Review CBC:   Lab Results   Component Value Date    WBC 11.0 (H) 02/01/2020    RBC 5.07 02/01/2020    HGB 15.3 02/01/2020    HCT 44.3 02/01/2020     02/01/2020     BMP:   Lab Results   Component Value Date    GLUCOSE 88 02/01/2020     02/01/2020    K 3.9 02/01/2020     02/01/2020    CO2 27 02/01/2020    BUN 7 02/01/2020    CREATININE 0.65 02/01/2020    CALCIUM 9.6 02/01/2020     Coagulation: No results found for: PT, INR, APTT  Cardiac markers: No results found for: TROPONINT, MYOGLOBIN  ABGs: No results found for: PHART, PHCAP, PHVEN, PO2, PO2ART, PO2CAP, KRK4SIY, UKZ2NTT, PCO2, BASEEXCESS  Radiology review: CAT scan reviewed    Assessment: 34-year-old female with acute on chronic cholecystitis, no evidence of cholangitis or pancreatitis, elevated liver enzymes of questionable significance.  Also with IUD possibly malpositioned based on CT.    Offered robotic cholecystectomy for palliation of symptoms and prevent progression of disease.  Risks and benefits discussed including 1% risk of serious complication, 5% risk of mild complication.  All questions answered, patient elects to proceed.    Patient will be kept after surgery for recheck of liver enzymes in the morning.  Patient is encouraged to follow-up with her OB/GYN regarding the possible malposition of the IUD.      Problems: [unfilled]    Plan: Robotic cholecystectomy.  Recheck labs in the morning.    A voice to text  program was used to aid in medical record documentation. Sometimes words are printed not exactly as they were spoken. While efforts were made to carefully edit and correct any inaccuracies, some errors may be present. Errors should be taken within the context of the discussion.  Please contact our office if you need assistance interpreting this medical record or notice any mistakes.     Electronically signed by:  Edelmira Hodges MD

## 2020-02-02 NOTE — PLAN OF CARE
Problem: Gastrointestinal - Adult  Goal: Minimal or absence of nausea and vomiting  Description: INTERVENTIONS:  1. Ensure adequate hydration  2. Monitor intake and output  3. Maintain NPO status until nausea and vomiting are resolved  4. Nasogastric tube to low intermittent suction as ordered  5. Administer ordered antiemetic medications as needed  6. Provide nonpharmacologic comfort measures as appropriate  7. Advance diet as ordered  8. Nutrition consult as indicated   Outcome: Adequate for Discharge  Goal: Maintains or returns to baseline digestive function  Description: INTERVENTIONS:  1. Assess bowel function  2. Ensure adequate hydration  3. Administer ordered medications as needed  4. Encourage mobilization and activity  5. Nutrition consult as indicated  6. Assess hydration and nutritional status  7. Assess characteristics and frequency of stool  8. Monitor for metabolic panel imbalances  9. Assess for treatment effectiveness  Outcome: Adequate for Discharge  Goal: Maintains adequate nutritional intake  Description: INTERVENTIONS:  1. Monitor percentage of each meal consumed  2. Identify factors contributing to decreased intake, treat as appropriate  3. Assist with meals as needed  4. Monitor I&O, weight and lab values  5. Obtain nutritional consult as indicated  6. Administer alternative nutrition interventions as ordered  Outcome: Adequate for Discharge  Goal: Nutrient intake appropriate for improving, restoring or maintaining nutritional needs  Description: INTERVENTIONS:  1. Assess nutritional status  2. Monitor oral intake, labs, and treatment plans  3. Provide appropriate diets, oral nutritional supplements, and vitamin/mineral supplements  4. Monitor, and adjust tube feedings and TPN/PPN based on assessed needs  5. Provide specific nutrition education as appropriate  Outcome: Adequate for Discharge     Problem: Skin/Tissue Integrity - Adult  Goal: Skin integrity remains intact  Description:  INTERVENTIONS  1. Assess and document risk factors for pressure ulcer development  2. Assess and document skin integrity  3. Monitor for areas of redness and/or skin breakdown  4. Initiate pressure ulcer prevention measures as indicated  Outcome: Adequate for Discharge  Goal: Incisions, wounds, or drain sites healing without S/S of infection  Description: INTERVENTIONS  1. Assess and document risk factors for skin breakdown  2. Assess and document skin integrity  3. Assess and document dressing/incision, wound bed, drain sites and surrounding tissue  4. Implement wound care per orders  Outcome: Adequate for Discharge     Problem: Pain - Adult  Goal: Verbalizes/displays adequate comfort level or baseline comfort level  Description: INTERVENTIONS:  1. Encourage patient to monitor pain and request interventions  2. Assess pain using the appropriate pain scale  3. Administer analgesics based on type and severity of pain and evaluate response  4. Educate/Implement non-pharmacological measures as appropriate and evaluate response  5. Consider cultural, developmental and social influences on pain and pain management  6. Notify Provider if interventions unsuccessful or patient reports new pain  Outcome: Adequate for Discharge     Problem: Infection - Adult  Goal: Absence of infection during hospitalization  Description: INTERVENTIONS:  1. Assess and monitor for signs and symptoms of infection  2. Monitor lab/diagnostic results  3. Monitor all insertion sites/wounds/incisions  4. Monitor secretions for changes in amount and color  5. Administer medications as ordered  6. Educate and encourage patient and family to use good hand hygiene technique  7. Identify and educate in appropriate isolation precautions for identified infection/condition  Outcome: Adequate for Discharge     Problem: Safety Adult  Goal: Patient will remain safe during hospitalization  Description: INTERVENTIONS    1. Assess patient for fall risk and  implement interventions if needed  2. Use safe transport techniques  3. Assess patient using the appropriate Ludin skin assessment scale  4. Assess patient for risk of aspiration  5. Assess patient for risk of elopement  6. Assess patient for risk of suicide  Outcome: Adequate for Discharge     Problem: Daily Care  Goal: Daily care needs are met  Description: INTERVENTIONS:   1. Assess and monitor skin integrity  2. Identify patients at risk for skin breakdown on admission and per policy  3. Assess and monitor ability to perform self care and identify potential discharge needs  4. Assess skin integrity/risk for skin breakdown  5. Assist patient with activities of daily living as needed  6. Encourage independent activity per ability   7. Provide mouth care   8. Include patient/family/caregiver in decisions related to daily care   Outcome: Adequate for Discharge     Problem: Knowledge Deficit  Goal: Patient/family/caregiver demonstrates understanding of disease process, treatment plan, medications, and discharge instructions  Description: INTERVENTIONS:   1. Complete learning assessment and assess knowledge base  2. Provide teaching at level of understanding   3. Provide teaching via preferred learning methods  Outcome: Adequate for Discharge     Problem: Discharge Barriers  Goal: Patient's discharge needs are met  Description: INTERVENTIONS:  1. Assess patient for self-management skills  2. Encourage participation in management  3. Identify potential discharge barriers on admission and throughout hospital stay  4. Involve patient/family/caregiver in discharge planning process  5. Collaborate with case management/ for discharge needs  Outcome: Adequate for Discharge

## 2020-02-02 NOTE — DISCHARGE INSTRUCTIONS
Pain Control After Surgery  Please take Tylenol (650mg) and Ibuprofen (600mg) every 6 hours for the next 2-3 days to help with pain

## 2020-02-02 NOTE — OP NOTE
Operative Report  Dr. Edelmira Hodges  [facility]    Patients name: Kenya Lopez  Patients : 1985  Medical record number: [id number]    ROBOTIC ASSISTED LAPAROSCOPIC CHOLECYSTECTOMY Procedure Note     Procedures:    * ROBOTIC ASSISTED LAPAROSCOPIC CHOLECYSTECTOMY  Indications: Acute on chronic cholecystitis.    33-year-old female with acute on chronic cholecystitis starting in August during her most recent pregnancy.  Patient had a flare this evening which was particularly severe, and was brought to the hospital.  CT scan demonstrated acute cholecystitis as well as a malpositioned intrauterine device with possible perforation of the uterus.  Patient was offered laparoscopic cholecystectomy with evaluation of the uterus.  All questions were answered, patient elected to proceed.       Pre-op Diagnosis     * Right upper quadrant abdominal pain [R10.11]    [unfilled]    [facility]    Surgeon: Edelmira Hodges MD    Assistant: first eladio Toro.  Assistance needed with poor placement, docking, exchange of robotic instruments, skin closure.    Anesthesia: General    Procedures: Procedure(s):  ROBOTIC ASSISTED LAPAROSCOPIC CHOLECYSTECTOMY    Procedure Details:   Patient was prepped and draped in usual sterile fashion.  Preoperative antibiotics were given, sequential compression devices were applied, WHO timeout was held verifying correct patient procedure positioning and availability of all equipment.    After the appropriate timeout a Veress needle was inserted in the left upper quadrant and pneumoperitoneum was established.  An 8 mm port was placed under Optiview in the left upper abdomen and entry into the peritoneal cavity was confirmed.  No injury was noted from port or Veress placement.  3 more 8 mm ports were placed in line with the first oriented toward the right upper quadrant.  A bilateral tap block was performed using quarter percent Marcaine.    Next the pelvis was explored with the patient in  Trendelenburg.  One arm of an intrauterine device was seen tenting through the muscle of the uterus on the patient's right, but not perforating the serosa or entangled in any bowel.  An intraoperative consult was placed to Dr. Tong of OB/GYN.    Next the patient was placed in reverse Trendelenburg and rotated right side up.  A acutely inflamed gallbladder with cystic amount of chronic cholecystitis was identified.  The dome was grasped and elevated and the infundibulum retracted laterally.  Monopolar electrocautery was used to take down the peritoneum laterally and medially and a critical view was obtained of the cystic duct and cystic artery.  2 clips were placed below on the cystic duct and 1 above 2 clips were placed below on the cystic artery and 1 above.  Both were divided with EndoShears.  ICG was injected and anatomy confirmed using firefly device.  The gallbladder was then removed from the liver bed and placed in an Endo Catch bag.  The liver bed was carefully cauterized and hemostasis was complete.  The gallbladder was then removed through the 8 mm port site.    At this point Dr. Tong of OB/GYN had arrived.  He evaluated the images and the IUD.  He recommended a trial of transvaginal removal, but requested the patient remain on the OR table in case laparoscopic assistance was required.    Patient remained in the OR with Dr. Tong.  Please see his note for further details.    Findings:  Perforated intrauterine device without bowel involvement, acute on chronic cholecystitis    Estimated Blood Loss:  No blood loss documented.           Drains: None           Total IV Fluids: See anesthesia record           Specimens:   ID Type Source Tests Collected by Time Destination   1 :  Tissue Gallbladder PATHOLOGY SPECIMEN (HISTOLOGY) Edelmira Hodges MD 2/1/2020 1904               Implants: * No implants in log *           Complications: No immediate complications           Disposition: Remains on table            Condition: Stable      A voice to text program was used to aid in medical record documentation. Sometimes words are printed not exactly as they were spoken. While efforts were made to carefully edit and correct any inaccuracies, some errors may be present. Errors should be taken within the context of the discussion.  Please contact our office if you need assistance interpreting this medical record or notice any mistakes.       Electronically signed by:  Edelmira Hodges MD

## 2020-02-02 NOTE — PERIOPERATIVE NURSING NOTE
Pt stated that she had no family present and her uncle could be called when the procedure was over, as per Cookie Miramontes RN in shift change report.

## 2020-02-02 NOTE — ANESTHESIA POSTPROCEDURE EVALUATION
Patient: Kenya Lopez    Procedure Summary     Date:  02/01/20 Room / Location:  [facility] OR  / [facility] OR    Anesthesia Start:  1836 Anesthesia Stop:  2009    Procedures:       ROBOTIC ASSISTED LAPAROSCOPIC CHOLECYSTECTOMY (N/A Abdomen)      FOREIGN BODY REMOVAL (N/A ) Diagnosis:       Right upper quadrant abdominal pain      (Right upper quadrant abdominal pain [R10.11])    Surgeon:  Edelmira Hodges MD; Rigoberto Tong MD Responsible Provider:  JERRY Laguna MD    Anesthesia Type:  general ASA Status:  2          Anesthesia Type: general    Last vitals  Vitals Value Taken Time   /42 2/1/2020  9:15 PM   Temp 36.7 °C (98.1 °F) 2/1/2020  9:15 PM   Pulse 62 2/1/2020  9:15 PM   Resp 23 2/1/2020  9:15 PM   SpO2 94 % 2/1/2020  9:15 PM   Pain Score 8 2/1/2020  9:08 PM       Anesthesia Post Evaluation    Patient location during evaluation: PACU  Patient participation: complete - patient participated  Level of consciousness: awake and alert  Pain management: adequate  Airway patency: patent  Anesthetic complications: no  Cardiovascular status: acceptable  Respiratory status: acceptable  Hydration status: acceptable  May dismiss recovered patient based on consultation with the appropriate physicians and/or meeting appropriate discharge criteria

## 2020-02-02 NOTE — ANESTHESIA PROCEDURE NOTES
Airway  Urgency: elective    Airway not difficult    General Information and Staff    Patient location during procedure: OR  CRNA: Morgan Herman CRNA  Performed: CRNA     Indications and Patient Condition  Indications for airway management: anesthesia  Spontaneous Ventilation: absent  Sedation level: deep  Preoxygenated: yes  Patient position: sniffing  MILS maintained throughout  Mask difficulty assessment: 1 - vent by mask    Final Airway Details  Final airway type: endotracheal airway      Successful airway: ETT  Cuffed: yes   Successful intubation technique: direct laryngoscopy  Blade: Subha  Blade size: #3  ETT size (mm): 7.0  Cormack-Lehane Classification: grade I - full view of glottis  Placement verified by: chest auscultation and capnometry   Cuff volume (mL): 7  Measured from: lips  ETT to lips (cm): 22  Number of attempts at approach: 1  Ventilation between attempts: none

## 2020-02-03 ENCOUNTER — TELEPHONE (OUTPATIENT)
Dept: SURGERY | Facility: CLINIC | Age: 35
End: 2020-02-03

## 2020-02-03 NOTE — TELEPHONE ENCOUNTER
Attempted to contact patient to schedule posts op appointment. No voicemail, unable to leave message. No answer.

## 2020-02-03 NOTE — TELEPHONE ENCOUNTER
----- Message from Esvin Yanez PA-C sent at 2/3/2020  9:05 AM MST -----  Regarding: post op appointment  Can you have this patient follow up with me in 2 weeks. Thanks

## 2020-02-06 NOTE — DISCHARGE SUMMARY
General Surgery Discharge Summary      Admitting Provider: Edelmira Hodges MD  Discharge Provider: Den Mcfarland MD  Primary Care Physician at Discharge: GABINO DUGAN [phone]     Admission Date: 2/1/2020     Discharge Date: 2/2/2020    Primary Discharge Diagnosis  Cholecystitis [K81.9]    Discharge Disposition  01 - Home or Self-Care  Code Status at Discharge: Prior    Outpatient Follow-Up  Future Appointments   Date Time Provider Department Center   2/7/2020 10:45 AM Ana Martinez DO [facility]         Presenting Problem/History of Present Illness  Dehydration [E86.0]  Biliary colic [K80.50]  Cholecystitis [K81.9]  Leukocytosis [D72.829]  Vomiting [R11.10]  Elevated transaminase level [R74.0]  Right upper quadrant abdominal pain [R10.11]  Acute cholecystitis due to biliary calculus [K80.00]    Operative Procedures Performed  Robotic assisted laparoscopic cholecystectomy and perforated IUD removal (Dr Hodges/Dr Tong - 2/1/20)    Hospital Course  Patient presented to the [facility] ED for acute on chronic RUQ abdominal pain. She first began having episodes of intermittent pain since August of 2019. This time she started having nausea that progressed to vomiting. She was found to have cholecystitis and malpositioned IUD. Patient was ultimately taken to the OR for surgery. During surgery her IUD was found to have perforated through the myometrium. Dr Tong was consulted and successfully removed the IUD. Patient did well postoperatively and at time of discharge the following morning was tolerating regular diet with minimal pain. She will follow up with both surgery and obgyn outpatient. Referral to obgyn made.    Vital Signs at Discharge  Discharge Condition: stable  Heart rate: 65  Resp: 20  BP: 98/53  Temp: 36.8 °C (98.2 °F)  Weight: 64.7 kg (142 lb 10.2 oz)    Physical Exam at Discharge  General: alert, oriented, no acute distress  Head: normocephalic  Eyes: extraocular movements intact  Mouth:  oral mucosa moist  Neck: no lymphadenopathy, no JVD  Lungs: clear to ausculation bilaterally, good air movement  Heart: regular rate and rhythm, no murmurs rubs or gallops  Abdomen: soft, appropriately tender, nondistened  Musculoskeletal: no edema  Surgical incision sites CDI and well approximated. No warmth, erythema, induration, areas of fluctuance, discharge or dehiscence noted.      Discharge Medications     Discharge medication list      CONTINUE taking these medications      Instructions   sertraline 50 mg tablet  Commonly known as:  ZOLOFT   Take 1 tablet (50 mg total) by mouth daily        STOP taking these medications    Mirena 20 mcg/24 hours (5 yrs) 52 mg IUD  Generic drug:  levonorgestrel

## 2020-02-12 ENCOUNTER — TELEPHONE (OUTPATIENT)
Dept: SURGERY | Facility: CLINIC | Age: 35
End: 2020-02-12

## 2020-02-18 ENCOUNTER — TELEPHONE (OUTPATIENT)
Dept: SURGERY | Facility: CLINIC | Age: 35
End: 2020-02-18

## 2020-02-18 NOTE — TELEPHONE ENCOUNTER
----- Message from Yvette Cole sent at 2/18/2020  8:59 AM MST -----  Regarding: RE: post op appointment  I have been unable to contact patient.   ----- Message -----  From: Talia Davis RN  Sent: 2/3/2020   9:35 AM [facility]  To: , #  Subject: FW: post op appointment                          Postop appointment with Tone. Attempted to contact patient but voice mailbox not set up.  DOS: 02/01, LAUREN LOCKWOOD/CHRISTINE  ----- Message -----  From: Esvin Yanez PA-C  Sent: 2/3/2020   9:05 AM [facility]  To: , #  Subject: post op appointment                              Can you have this patient follow up with me in 2 weeks. Thanks

## 2021-05-12 ENCOUNTER — HOSPITAL ENCOUNTER (INPATIENT)
Facility: HOSPITAL | Age: 36
LOS: 2 days | Discharge: HOME | End: 2021-05-14
Attending: OBSTETRICS & GYNECOLOGY | Admitting: OBSTETRICS & GYNECOLOGY
Payer: COMMERCIAL

## 2021-05-12 ENCOUNTER — ANESTHESIA (OUTPATIENT)
Dept: OBSTETRICS AND GYNECOLOGY | Facility: HOSPITAL | Age: 36
End: 2021-05-12
Payer: COMMERCIAL

## 2021-05-12 ENCOUNTER — ANESTHESIA EVENT (OUTPATIENT)
Dept: OBSTETRICS AND GYNECOLOGY | Facility: HOSPITAL | Age: 36
End: 2021-05-12
Payer: COMMERCIAL

## 2021-05-12 LAB
ABO GROUP (TYPE) IN BLOOD: NORMAL
ANTIBODY SCREEN: NORMAL
D AG BLD QL: NORMAL
ERYTHROCYTE [DISTWIDTH] IN BLOOD BY AUTOMATED COUNT: 13.7 % (ref 11.5–14)
HCT VFR BLD AUTO: 36.9 % (ref 34–45)
HGB BLD-MCNC: 12.9 G/DL (ref 11.5–15.5)
MCH RBC QN AUTO: 32.5 PG (ref 28–33)
MCHC RBC AUTO-ENTMCNC: 34.9 G/DL (ref 32–36)
MCV RBC AUTO: 93.4 FL (ref 81–97)
PLATELET # BLD AUTO: 290 10*3/UL (ref 140–350)
PMV BLD AUTO: 7.7 FL (ref 6.9–10.8)
RBC # BLD AUTO: 3.95 10*6/ΜL (ref 3.7–5.3)
SECOND/CONFIRMATORY ABORH PERFORMED: NORMAL
WBC # BLD AUTO: 12.5 10*3/UL (ref 4.5–10.5)

## 2021-05-12 PROCEDURE — C1755 CATHETER, INTRASPINAL: HCPCS | Performed by: ANESTHESIOLOGY

## 2021-05-12 PROCEDURE — 86885 COOMBS TEST INDIRECT QUAL: CPT

## 2021-05-12 PROCEDURE — 6360000200 HC RX 636 W HCPCS (ALT 250 FOR IP): Performed by: ANESTHESIOLOGY

## 2021-05-12 PROCEDURE — 7210000200 HC COMPLEX LABOR PER HOUR

## 2021-05-12 PROCEDURE — 2580000300 HC RX 258: Performed by: OBSTETRICS & GYNECOLOGY

## 2021-05-12 PROCEDURE — 6360000200 HC RX 636 W HCPCS (ALT 250 FOR IP): Performed by: OBSTETRICS & GYNECOLOGY

## 2021-05-12 PROCEDURE — 59409 OBSTETRICAL CARE: CPT

## 2021-05-12 PROCEDURE — 00HU33Z INSERTION OF INFUSION DEVICE INTO SPINAL CANAL, PERCUTANEOUS APPROACH: ICD-10-PCS | Performed by: ANESTHESIOLOGY

## 2021-05-12 PROCEDURE — 86780 TREPONEMA PALLIDUM: CPT | Performed by: OBSTETRICS & GYNECOLOGY

## 2021-05-12 PROCEDURE — 62326 NJX INTERLAMINAR LMBR/SAC: CPT

## 2021-05-12 PROCEDURE — 7200000400 HC RECOVERY CARE VAGINAL DELIVERY 1ST 2 HOURS

## 2021-05-12 PROCEDURE — 2500000200 HC RX 250 WO HCPCS: Performed by: ANESTHESIOLOGY

## 2021-05-12 PROCEDURE — 85027 COMPLETE CBC AUTOMATED: CPT | Performed by: OBSTETRICS & GYNECOLOGY

## 2021-05-12 PROCEDURE — (BLANK) HC ROOM PRIVATE OBSTETRICS

## 2021-05-12 PROCEDURE — 7200000500 HC RECOVERY CARE VAGINAL DELIVERY ADDITIONAL 1/2 HOUR

## 2021-05-12 RX ORDER — FENTANYL CITRATE/PF 50 MCG/ML
PLASTIC BAG, INJECTION (ML) INTRAVENOUS AS NEEDED
Status: DISCONTINUED | OUTPATIENT
Start: 2021-05-12 | End: 2021-05-12 | Stop reason: SURG

## 2021-05-12 RX ORDER — ACETAMINOPHEN 500 MG
500-1000 TABLET ORAL EVERY 4 HOURS PRN
Status: DISCONTINUED | OUTPATIENT
Start: 2021-05-12 | End: 2021-05-14 | Stop reason: HOSPADM

## 2021-05-12 RX ORDER — FENTANYL CITRATE/PF 50 MCG/ML
100 PLASTIC BAG, INJECTION (ML) INTRAVENOUS ONCE
Status: COMPLETED | OUTPATIENT
Start: 2021-05-12 | End: 2021-05-12

## 2021-05-12 RX ORDER — BETAMETHASONE SODIUM PHOSPHATE AND BETAMETHASONE ACETATE 3; 3 MG/ML; MG/ML
12 INJECTION, SUSPENSION INTRA-ARTICULAR; INTRALESIONAL; INTRAMUSCULAR; SOFT TISSUE ONCE
Status: COMPLETED | OUTPATIENT
Start: 2021-05-12 | End: 2021-05-12

## 2021-05-12 RX ORDER — FOLIC ACID/MULTIVIT,IRON,MINER 0.4MG-18MG
1 TABLET ORAL DAILY
Status: DISCONTINUED | OUTPATIENT
Start: 2021-05-13 | End: 2021-05-14 | Stop reason: HOSPADM

## 2021-05-12 RX ORDER — SODIUM CHLORIDE, SODIUM LACTATE, POTASSIUM CHLORIDE, CALCIUM CHLORIDE 600; 310; 30; 20 MG/100ML; MG/100ML; MG/100ML; MG/100ML
125 INJECTION, SOLUTION INTRAVENOUS CONTINUOUS
Status: DISCONTINUED | OUTPATIENT
Start: 2021-05-12 | End: 2021-05-13

## 2021-05-12 RX ORDER — FOLIC ACID/MULTIVIT,IRON,MINER 0.4MG-18MG
1 TABLET ORAL DAILY
COMMUNITY

## 2021-05-12 RX ORDER — SODIUM CHLORIDE, SODIUM LACTATE, POTASSIUM CHLORIDE, AND CALCIUM CHLORIDE .6; .31; .03; .02 G/100ML; G/100ML; G/100ML; G/100ML
1000 INJECTION, SOLUTION INTRAVENOUS ONCE
Status: DISCONTINUED | OUTPATIENT
Start: 2021-05-12 | End: 2021-05-13

## 2021-05-12 RX ORDER — LIDOCAINE HYDROCHLORIDE 10 MG/ML
30 INJECTION, SOLUTION EPIDURAL; INFILTRATION; INTRACAUDAL; PERINEURAL ONCE
Status: DISCONTINUED | OUTPATIENT
Start: 2021-05-12 | End: 2021-05-13

## 2021-05-12 RX ORDER — FENTANYL CITRATE/PF 50 MCG/ML
50 PLASTIC BAG, INJECTION (ML) INTRAVENOUS
Status: DISCONTINUED | OUTPATIENT
Start: 2021-05-12 | End: 2021-05-13

## 2021-05-12 RX ORDER — ACETAMINOPHEN 500 MG
500-1000 TABLET ORAL EVERY 6 HOURS PRN
Status: DISCONTINUED | OUTPATIENT
Start: 2021-05-12 | End: 2021-05-13

## 2021-05-12 RX ORDER — DIPHENHYDRAMINE HYDROCHLORIDE 50 MG/ML
25 INJECTION INTRAMUSCULAR; INTRAVENOUS EVERY 6 HOURS PRN
Status: DISCONTINUED | OUTPATIENT
Start: 2021-05-12 | End: 2021-05-13

## 2021-05-12 RX ORDER — OXYTOCIN/0.9 % SODIUM CHLORIDE 30/500 ML
1-20 PLASTIC BAG, INJECTION (ML) INTRAVENOUS
Status: DISCONTINUED | OUTPATIENT
Start: 2021-05-12 | End: 2021-05-13

## 2021-05-12 RX ORDER — BUPIVACAINE HYDROCHLORIDE 2.5 MG/ML
INJECTION, SOLUTION EPIDURAL; INFILTRATION; INTRACAUDAL AS NEEDED
Status: DISCONTINUED | OUTPATIENT
Start: 2021-05-12 | End: 2021-05-12 | Stop reason: SURG

## 2021-05-12 RX ORDER — LIDOCAINE HYDROCHLORIDE AND EPINEPHRINE 15; 5 MG/ML; UG/ML
INJECTION, SOLUTION EPIDURAL AS NEEDED
Status: DISCONTINUED | OUTPATIENT
Start: 2021-05-12 | End: 2021-05-12 | Stop reason: SURG

## 2021-05-12 RX ORDER — ONDANSETRON HYDROCHLORIDE 2 MG/ML
4 INJECTION, SOLUTION INTRAVENOUS EVERY 6 HOURS PRN
Status: DISCONTINUED | OUTPATIENT
Start: 2021-05-12 | End: 2021-05-13

## 2021-05-12 RX ORDER — DOCUSATE SODIUM 100 MG/1
100 CAPSULE, LIQUID FILLED ORAL 2 TIMES DAILY
Status: DISCONTINUED | OUTPATIENT
Start: 2021-05-13 | End: 2021-05-14 | Stop reason: HOSPADM

## 2021-05-12 RX ORDER — CALCIUM CARBONATE 200(500)MG
500 TABLET,CHEWABLE ORAL
Status: DISCONTINUED | OUTPATIENT
Start: 2021-05-12 | End: 2021-05-13

## 2021-05-12 RX ORDER — NALOXONE HYDROCHLORIDE 0.4 MG/ML
0.2 INJECTION, SOLUTION INTRAMUSCULAR; INTRAVENOUS; SUBCUTANEOUS AS NEEDED
Status: DISCONTINUED | OUTPATIENT
Start: 2021-05-12 | End: 2021-05-13

## 2021-05-12 RX ORDER — IBUPROFEN 800 MG/1
800 TABLET ORAL EVERY 8 HOURS
Status: DISCONTINUED | OUTPATIENT
Start: 2021-05-13 | End: 2021-05-14 | Stop reason: HOSPADM

## 2021-05-12 RX ORDER — FENTANYL CITRATE/PF 50 MCG/ML
100 PLASTIC BAG, INJECTION (ML) INTRAVENOUS
Status: DISCONTINUED | OUTPATIENT
Start: 2021-05-12 | End: 2021-05-13

## 2021-05-12 RX ORDER — AMMONIA 15 % (W/V)
1 AMPUL (EA) INHALATION AS NEEDED
Status: DISCONTINUED | OUTPATIENT
Start: 2021-05-12 | End: 2021-05-14 | Stop reason: HOSPADM

## 2021-05-12 RX ORDER — FENTANYL/ROPIVACAINE/NS/PF 2MCG/ML-.2
PLASTIC BAG, INJECTION (ML) INJECTION CONTINUOUS
Status: DISCONTINUED | OUTPATIENT
Start: 2021-05-12 | End: 2021-05-13

## 2021-05-12 RX ADMIN — FENTANYL CITRATE 100 MCG: 50 INJECTION, SOLUTION INTRAMUSCULAR; INTRAVENOUS at 22:53

## 2021-05-12 RX ADMIN — BUPIVACAINE HYDROCHLORIDE 3 ML: 2.5 INJECTION, SOLUTION EPIDURAL; INFILTRATION; INTRACAUDAL at 23:18

## 2021-05-12 RX ADMIN — OXYTOCIN-SODIUM CHLORIDE 0.9% IV SOLN 30 UNIT/500ML 1 MILLI-UNITS/MIN: 30-0.9/5 SOLUTION at 20:07

## 2021-05-12 RX ADMIN — FENTANYL CITRATE 100 MCG: 50 INJECTION INTRAMUSCULAR; INTRAVENOUS at 23:18

## 2021-05-12 RX ADMIN — LIDOCAINE HYDROCHLORIDE AND EPINEPHRINE 3 ML: 15; 5 INJECTION, SOLUTION EPIDURAL at 23:13

## 2021-05-12 RX ADMIN — SODIUM CHLORIDE 3 MILLION UNITS: 9 INJECTION, SOLUTION INTRAVENOUS at 23:27

## 2021-05-12 RX ADMIN — BETAMETHASONE ACETATE AND BETAMETHASONE SODIUM PHOSPHATE 12 MG: 3; 3 INJECTION, SUSPENSION INTRA-ARTICULAR; INTRALESIONAL; INTRAMUSCULAR; SOFT TISSUE at 19:16

## 2021-05-12 RX ADMIN — SODIUM CHLORIDE 5 MILLION UNITS: 9 INJECTION, SOLUTION INTRAVENOUS at 19:12

## 2021-05-12 RX ADMIN — SODIUM CHLORIDE, POTASSIUM CHLORIDE, SODIUM LACTATE AND CALCIUM CHLORIDE 125 ML/HR: 600; 310; 30; 20 INJECTION, SOLUTION INTRAVENOUS at 19:10

## 2021-05-12 RX ADMIN — SODIUM CHLORIDE, POTASSIUM CHLORIDE, SODIUM LACTATE AND CALCIUM CHLORIDE 125 ML/HR: 600; 310; 30; 20 INJECTION, SOLUTION INTRAVENOUS at 23:26

## 2021-05-12 RX ADMIN — FENTANYL CITRATE 100 MCG: 50 INJECTION, SOLUTION INTRAMUSCULAR; INTRAVENOUS at 22:57

## 2021-05-12 RX ADMIN — Medication: at 23:30

## 2021-05-12 ASSESSMENT — ACTIVITIES OF DAILY LIVING (ADL)
ADEQUATE_TO_COMPLETE_ADL: YES
ASSISTIVE_DEVICE: EYEGLASSES
PATIENT'S MEMORY ADEQUATE TO SAFELY COMPLETE DAILY ACTIVITIES?: YES

## 2021-05-13 LAB
EXTERNAL GROUP B STREP DNA: NEGATIVE
T PALLIDUM IGG SER QL IA: NORMAL

## 2021-05-13 PROCEDURE — (BLANK) HC ROOM PRIVATE OBSTETRICS

## 2021-05-13 PROCEDURE — 2590000100 HC RX 259: Performed by: OBSTETRICS & GYNECOLOGY

## 2021-05-13 PROCEDURE — 6370000100 HC RX 637 (ALT 250 FOR IP): Performed by: OBSTETRICS & GYNECOLOGY

## 2021-05-13 RX ORDER — OXYTOCIN/0.9 % SODIUM CHLORIDE 30/500 ML
250 PLASTIC BAG, INJECTION (ML) INTRAVENOUS
Status: DISCONTINUED | OUTPATIENT
Start: 2021-05-13 | End: 2021-05-14 | Stop reason: HOSPADM

## 2021-05-13 RX ORDER — OXYTOCIN/0.9 % SODIUM CHLORIDE 30/500 ML
125-999 PLASTIC BAG, INJECTION (ML) INTRAVENOUS CONTINUOUS
Status: DISCONTINUED | OUTPATIENT
Start: 2021-05-13 | End: 2021-05-14 | Stop reason: HOSPADM

## 2021-05-13 RX ORDER — CALCIUM CARBONATE 200(500)MG
750 TABLET,CHEWABLE ORAL 3 TIMES DAILY PRN
Status: DISCONTINUED | OUTPATIENT
Start: 2021-05-13 | End: 2021-05-14 | Stop reason: HOSPADM

## 2021-05-13 RX ADMIN — Medication 1 TABLET: at 09:00

## 2021-05-13 RX ADMIN — Medication 1000 MG: at 21:12

## 2021-05-13 RX ADMIN — CALCIUM CARBONATE (ANTACID) CHEW TAB 500 MG 750 MG: 500 CHEW TAB at 12:23

## 2021-05-13 RX ADMIN — IBUPROFEN 800 MG: 800 TABLET, FILM COATED ORAL at 00:34

## 2021-05-13 RX ADMIN — DOCUSATE SODIUM 100 MG: 100 CAPSULE, LIQUID FILLED ORAL at 00:34

## 2021-05-13 RX ADMIN — DOCUSATE SODIUM 100 MG: 100 CAPSULE, LIQUID FILLED ORAL at 08:28

## 2021-05-13 RX ADMIN — IBUPROFEN 800 MG: 800 TABLET, FILM COATED ORAL at 16:56

## 2021-05-13 RX ADMIN — DOCUSATE SODIUM 100 MG: 100 CAPSULE, LIQUID FILLED ORAL at 21:12

## 2021-05-13 RX ADMIN — IBUPROFEN 800 MG: 800 TABLET, FILM COATED ORAL at 08:28

## 2021-05-13 RX ADMIN — Medication 1000 MG: at 12:23

## 2021-05-13 NOTE — PLAN OF CARE
Problem: Pain - Adult  Goal: Verbalizes/displays adequate comfort level or baseline comfort level  Description: INTERVENTIONS:  1. Encourage patient to monitor pain and request interventions  2. Assess pain using the appropriate pain scale  3. Administer analgesics based on type and severity of pain and evaluate response  4. Educate/Implement non-pharmacological measures as appropriate and evaluate response  5. Consider cultural, developmental and social influences on pain and pain management  6. Notify Provider if interventions unsuccessful or patient reports new pain  Outcome: Progressing  Flowsheets (Taken 5/13/2021 0438 by Padma Mckeon RN)  Verbalizes/displays adequate comfort level or baseline comfort level:   Encourage patient to monitor pain and request interventions   Assess pain using the appropriate pain scale   Administer analgesics based on type and severity of pain and evaluate response   Educate/Implement non-pharmacological measures as appropriate and evaluate response   Notify Provider if interventions unsuccessful or patient reports new pain     Problem: Infection - Adult  Goal: Absence of infection during hospitalization  Description: INTERVENTIONS:  1. Assess and monitor for signs and symptoms of infection  2. Monitor lab/diagnostic results  3. Monitor all insertion sites/wounds/incisions  4. Monitor secretions for changes in amount and color  5. Administer medications as ordered  6. Educate and encourage patient and family to use good hand hygiene technique  7. Identify and educate in appropriate isolation precautions for identified infection/condition  Outcome: Progressing  Flowsheets (Taken 5/13/2021 0438 by Padma Mckeon RN)  Absence of infection during hospitalization:   Assess and monitor for signs and symptoms of infection   Monitor lab/diagnostic results   Monitor secretions for changes in amount and colo   Administer medications as ordered     Problem: Safety Adult -  Fall  Goal: Free from fall injury  Description: INTERVENTIONS:    Inpatient - Please reference Cares/Safety Flowsheet under [place] Fall Risk for interventions.  Pediatrics - Please reference Peds Daily Cares/Safety Flowsheet under [facility] Pediatric Fall Assessment Fall Bundle for interventions  LD/OB - Please reference OB Shift Screening Flowsheet under OB Fall Risk for interventions.  Outcome: Progressing     Problem: Discharge Planning  Goal: Discharge to home or other facility with appropriate resources  Description: INTERVENTIONS:  1. Identify and discuss barriers to discharge with patient and caregiver.  2. Arrange for needed discharge resources and transportation as appropriate.  3. Identify discharge learning needs (meds, wound care, etc).  4. Arrange for interpreters to assist at discharge as needed.  5. Refer to  for coordinating discharge planning if the patient needs post-hospital services based on physician order or complex needs related to functional status, cognitive ability or social support system.  Outcome: Progressing  Flowsheets (Taken 2021 by Padma Mckeon, TIO)  Discharge to home or other facility with appropriate resources: Identify and discuss barriers to discharge with patient and caregiver     Problem: Postpartum  Goal: Experiences normal postpartum course  Description: INTERVENTIONS:  1. Monitor maternal vital signs  2. Assess uterine involution  Outcome: Progressing  Flowsheets (Taken 2021 by Padma Mckeon, RN)  Experiences normal postpartum course:   Monitor maternal vital signs   Assess uterine involution  Goal: Appropriate maternal -  bonding  Description: INTERVENTIONS:  1. Identify family support  2. Referral to  or  as needed  Outcome: Progressing  Flowsheets (Taken 2021 by Padma Mckeon RN)  Appropriate maternal- bonding: Identify family support  Goal: Establishment of infant feeding  pattern  Description: INTERVENTIONS:  1. Assess breast/bottle feeding  2. Refer to lactation as needed  Outcome: Progressing  Flowsheets (Taken 5/13/2021 0438 by Padma Mckeon RN)  Establishment of infant feeding pattern:   Refer to lactation as needed   Assess breast/bottle feeding  Goal: Incisions, wounds, or drain sites healing without S/S of infection  Description: INTERVENTIONS  1. Assess and document risk factors for skin breakdown  2. Assess and document skin integrity  3. Assess and document dressing/incision, wound bed, drain sites and surrounding tissue  4. Implement wound care per orders  Outcome: Progressing  Flowsheets (Taken 5/13/2021 0438 by Padma Mckeon RN)  Incision(s), wound(s) or drain site(s) healing without S/S of infection: Assess and document skin integrity

## 2021-05-13 NOTE — NURSING END OF SHIFT
Nursing End of Shift Summary    Goals:  Clinical Goals for the Shift: Active labor, and deliver    Narrative Summary of Progress Towards Clinical Goals:   of viable female    Barriers for Transfer or Discharge: no  SBAR to MAX Mckeon RN to assume care of pt.

## 2021-05-13 NOTE — PLAN OF CARE
Problem: Pain - Adult  Goal: Verbalizes/displays adequate comfort level or baseline comfort level  Description: INTERVENTIONS:  1. Encourage patient to monitor pain and request interventions  2. Assess pain using the appropriate pain scale  3. Administer analgesics based on type and severity of pain and evaluate response  4. Educate/Implement non-pharmacological measures as appropriate and evaluate response  5. Consider cultural, developmental and social influences on pain and pain management  6. Notify Provider if interventions unsuccessful or patient reports new pain  Outcome: Progressing  Flowsheets (Taken 5/13/2021 0438)  Verbalizes/displays adequate comfort level or baseline comfort level:   Encourage patient to monitor pain and request interventions   Assess pain using the appropriate pain scale   Administer analgesics based on type and severity of pain and evaluate response   Educate/Implement non-pharmacological measures as appropriate and evaluate response   Notify Provider if interventions unsuccessful or patient reports new pain     Problem: Infection - Adult  Goal: Absence of infection during hospitalization  Description: INTERVENTIONS:  1. Assess and monitor for signs and symptoms of infection  2. Monitor lab/diagnostic results  3. Monitor all insertion sites/wounds/incisions  4. Monitor secretions for changes in amount and color  5. Administer medications as ordered  6. Educate and encourage patient and family to use good hand hygiene technique  7. Identify and educate in appropriate isolation precautions for identified infection/condition  Outcome: Progressing  Flowsheets (Taken 5/13/2021 0438)  Absence of infection during hospitalization:   Assess and monitor for signs and symptoms of infection   Monitor lab/diagnostic results   Monitor secretions for changes in amount and colo   Administer medications as ordered     Problem: Safety Adult - Fall  Goal: Free from fall injury  Description:  INTERVENTIONS:    Inpatient - Please reference Cares/Safety Flowsheet under [place] Fall Risk for interventions.  Pediatrics - Please reference Peds Daily Cares/Safety Flowsheet under [facility] Pediatric Fall Assessment Fall Bundle for interventions  LD/OB - Please reference OB Shift Screening Flowsheet under OB Fall Risk for interventions.  Outcome: Progressing     Problem: Discharge Planning  Goal: Discharge to home or other facility with appropriate resources  Description: INTERVENTIONS:  1. Identify and discuss barriers to discharge with patient and caregiver.  2. Arrange for needed discharge resources and transportation as appropriate.  3. Identify discharge learning needs (meds, wound care, etc).  4. Arrange for interpreters to assist at discharge as needed.  5. Refer to  for coordinating discharge planning if the patient needs post-hospital services based on physician order or complex needs related to functional status, cognitive ability or social support system.  Outcome: Progressing  Flowsheets (Taken 2021 043)  Discharge to home or other facility with appropriate resources: Identify and discuss barriers to discharge with patient and caregiver     Problem: Postpartum  Goal: Experiences normal postpartum course  Description: INTERVENTIONS:  1. Monitor maternal vital signs  2. Assess uterine involution  Outcome: Progressing  Flowsheets (Taken 2021 043)  Experiences normal postpartum course:   Monitor maternal vital signs   Assess uterine involution  Goal: Appropriate maternal -  bonding  Description: INTERVENTIONS:  1. Identify family support  2. Referral to  or  as needed  Outcome: Progressing  Flowsheets (Taken 2021 043)  Appropriate maternal- bonding: Identify family support  Goal: Establishment of infant feeding pattern  Description: INTERVENTIONS:  1. Assess breast/bottle feeding  2. Refer to lactation as needed  Outcome:  Progressing  Flowsheets (Taken 5/13/2021 0438)  Establishment of infant feeding pattern:   Refer to lactation as needed   Assess breast/bottle feeding  Goal: Incisions, wounds, or drain sites healing without S/S of infection  Description: INTERVENTIONS  1. Assess and document risk factors for skin breakdown  2. Assess and document skin integrity  3. Assess and document dressing/incision, wound bed, drain sites and surrounding tissue  4. Implement wound care per orders  Outcome: Progressing  Flowsheets (Taken 5/13/2021 0438)  Incision(s), wound(s) or drain site(s) healing without S/S of infection: Assess and document skin integrity

## 2021-05-13 NOTE — ANESTHESIA PROCEDURE NOTES
Epidural Block    Patient location during procedure: OB  Start time: 5/12/2021 11:00 PM  End time: 5/12/2021 11:18 PM  Reason for block: labor epidural    Staffing  Anesthesiologist: Howard Yin MD  Performed: anesthesiologist   Preanesthetic Checklist  Completed: patient identified, IV checked, site marked, risks and benefits discussed, surgical consent, monitors and equipment checked, pre-op evaluation and timeout performed  Epidural  Patient position: sitting  Prep: Betadine  Patient monitoring: blood pressure monitoring and continuous pulse oximetry  Approach: midline  Location: L3-L4  Injection technique: DONAVAN air  Procedure: negative aspiration for heme,  no paresthesia on injection and negative aspiration for CSF  Local infiltration: lidocaine 1%  Needle  Needle type: Tuohy   Needle gauge: 17 G  Test dose: negative and lidocaine 1.5% with epinephrine 1-to-200,000    Catheter secured with: tape, Neoderm and Mastisol    Assessment  Events: well tolerated

## 2021-05-13 NOTE — L&D DELIVERY NOTE
OB Vaginal Delivery Note    Date: 2021  Name: Kenya Lopez  Age: 35 y.o.  Primary OB: Michael Dumont MD    Review the Delivery Report for details.     GA: 36w0d    GP:     Labor Complications:  TOLAC, PPROM     Anesthesia: epidural    Delivery Type: Successful     ROM to Delivery Time: 7h 12m     Sex: Female     [place] Weight:       1 Minute 5 Minute 10 Minute 15 Minute 20 Minute   Apgar Totals:   8   9              Delivery Details:  Patient presented with PPROM. Received one dose of steroids and pitocin. Received epidural and progressed to 10cm. Pushed well and delivered a viable female infant in OA over intact perineum. Nuchal x1, reduced. No dystocia. Infant vigorous and delivered to mothers abdomen. Cord delayed clamped and cut. Perineum intact. Pit bolused. Placenta delived intact with 3vc. Mother and infant stable to pp.     ________________________________________  Shiela Pabon MD  21 11:54 PM

## 2021-05-13 NOTE — PLAN OF CARE
Problem: Pain - Adult  Goal: Verbalizes/displays adequate comfort level or baseline comfort level  Description: INTERVENTIONS:  1. Encourage patient to monitor pain and request interventions  2. Assess pain using the appropriate pain scale  3. Administer analgesics based on type and severity of pain and evaluate response  4. Educate/Implement non-pharmacological measures as appropriate and evaluate response  5. Consider cultural, developmental and social influences on pain and pain management  6. Notify Provider if interventions unsuccessful or patient reports new pain  Outcome: Progressing  Flowsheets (Taken 5/12/2021 3357)  Verbalizes/displays adequate comfort level or baseline comfort level:   Encourage patient to monitor pain and request interventions   Assess pain using the appropriate pain scale   Administer analgesics based on type and severity of pain and evaluate response   Educate/Implement non-pharmacological measures as appropriate and evaluate response   Consider cultural, developmental and social influences on pain and pain management   Notify Provider if interventions unsuccessful or patient reports new pain

## 2021-05-13 NOTE — NURSING END OF SHIFT
Nursing End of Shift Summary    Goals:  Clinical Goals for the Shift: Admit to labor and delivery    Narrative Summary of Progress Towards Clinical Goals:  Pt admitted    Barriers for Transfer or Discharge: yes delivery and pP care     Report to Vannesa JAIMES RN to assume pt care

## 2021-05-13 NOTE — ANESTHESIA PREPROCEDURE EVALUATION
Pre-Procedure Assessment    Patient: Kenya Lopez, female, 35 y.o.    Ht Readings from Last 1 Encounters:   05/12/21 1.524 m (5')     Wt Readings from Last 1 Encounters:   05/12/21 74.4 kg (164 lb 0.4 oz)       Last Vitals  BP 99/58 (05/12/21 2233)    Temp 36.7 °C (98.1 °F) (05/12/21 2203)    Pulse 85 (05/12/21 2233)   Resp 16 (05/12/21 2203)    SpO2 96 % (05/12/21 2203)    Pain Score 6 (05/12/21 2233)       Problem list reviewed and Medical history reviewed           Airway   Mallampati: II  TM distance: >3 FB  Neck ROM: full      Dental      Pulmonary     breath sounds clear to auscultation  Cardiovascular   (+) valvular problems/murmurs (tetrology of fallot S/P 3 open heart surgeries),     Rhythm: regular  Rate: normal    Mental Status/Neuro/Psych    Pt is alert.        GI/Hepatic/Renal      Endo/Other      Comments: No blood thinners  No bleeding history  Abdominal           Social History     Tobacco Use   • Smoking status: Current Every Day Smoker     Packs/day: 0.50     Types: Cigarettes   • Smokeless tobacco: Never Used   • Tobacco comment: 5 cigarettes daily   Substance Use Topics   • Alcohol use: Not Currently     Comment: rarely      Hematology   WBC   Date Value Ref Range Status   05/12/2021 12.5 (H) 4.5 - 10.5 10*3/uL Final     RBC   Date Value Ref Range Status   05/12/2021 3.95 3.70 - 5.30 10*6/µL Final     MCV   Date Value Ref Range Status   05/12/2021 93.4 81.0 - 97.0 fL Final     Hemoglobin   Date Value Ref Range Status   05/12/2021 12.9 11.5 - 15.5 g/dL Final     Hematocrit   Date Value Ref Range Status   05/12/2021 36.9 34.0 - 45.0 % Final     Platelets   Date Value Ref Range Status   05/12/2021 290 140 - 350 10*3/uL Final      Coagulation No results found for: PT, APTT, INR   General Chemistry   Calcium   Date Value Ref Range Status   02/02/2020 8.4 (L) 8.6 - 10.3 mg/dL Final     BUN   Date Value Ref Range Status   02/02/2020 9 7 - 25 mg/dL Final     Creatinine   Date Value Ref Range Status    02/02/2020 0.63 0.60 - 1.20 mg/dL Final     Glucose   Date Value Ref Range Status   02/02/2020 202 (H) 70 - 105 mg/dL Final     Sodium   Date Value Ref Range Status   02/02/2020 138 135 - 145 mmol/L Final     Potassium   Date Value Ref Range Status   02/02/2020 4.0 3.5 - 5.1 mmol/L Final     Magnesium   Date Value Ref Range Status   02/01/2020 2.1 1.8 - 2.4 mg/dL Final     CO2   Date Value Ref Range Status   02/02/2020 23 21 - 32 mmol/L Final     Chloride   Date Value Ref Range Status   02/02/2020 105 98 - 107 mmol/L Final     Anesthesia Plan    ASA 3   NPO status reviewed: > 8 hours    Epidural                          Anesthetic plan and risks discussed with patient.

## 2021-05-13 NOTE — H&P
History and Physical    Chief complaint: PPROM    HPI  Kenya Lopez is a 35 y.o. female with an Estimated Date of Delivery: 21. She is currently a 36w0d  who is being admitted for PROM. Her current pregnancy is significant for prior . Patient reports irregular contractions, leakage of fluid and no bleeding. Fetal Movement: normal. Previous pregnancies complicated by prior .     OB History    Para Term  AB Living   6 4 2 2 1 4   SAB TAB Ectopic Molar Multiple Live Births   0 1 0 0   4      # Outcome Date GA Lbr [place]/2nd Weight Sex Delivery Anes PTL Lv   6 Current            5 Term 19 37w0d  3.13 kg M CS-Unspec EPI  LINDSAY   4 TAB 14              Birth Comments: Emergency termination, Eileen required open heart surgery   3  05 36w0d  3.204 kg M Vag-Spont   LINDSAY      Birth Comments: induced for pain   2  04    F Vag-Spont EPI  LINDSAY   1 Term 02 38w0d  2.835 kg M Vag-Spont   LINDSAY     Past Medical History:   Diagnosis Date   • Cardiovascular disease    • Fallot tetralogy    • Fallot tetralogy    • History of aortic valve repair    • History of tetralogy of Fallot repair    • Murmur    • Ventricular septal defect      Past Surgical History:   Procedure Laterality Date   • CARDIAC SURGERY     • CHOLECYSTECTOMY  2020    Robotic Assisted Laparoscopic Cholecystectomy [Xilapcho], Foreign Body Removal [Forbod]   • TONSILLECTOMY       Social History      Most Recent Value   Living Arrangements  Minor children, Spouse/significant other   Support Systems  --   Type of Residence  Private residence        History reviewed. No pertinent family history.  No Known Allergies  Medications Prior to Admission   Medication Sig   • MVP-ferrous fumarate-FA (Prenatal) 28 mg iron- 800 mcg tablet Take 1 tablet by mouth daily       Review of Systems:  Pertinent items are noted in HPI.    Objective     Vital signs:  Vitals:    21 2341   BP: 114/59    Pulse: 94   Resp:    Temp:    SpO2:        Maternal Type & Screen     Maternal Type & Screen ([facility] Results)     Test Value Date Time    ABO (MH Results)  AB  21    Rh (MH Results)  POS  21    Antibody Screen ( Results)  NEG  21          Legend    ^: Historical                    Maternal Type & Screen     Maternal Type & Screen (External Results)     Test Value Date Time    ABO (External Results) ^ AB  19     Rh (External Results) ^ Positive  19     Antibody screen (External Results) ^ Negative  19           Legend    ^: Historical                    Prenatal Results     Maternal Prenatal Labs     Test Value Date Time    Gonorrhea        Chlamydia        Syphilis Ab       RPR        Rubella       HBsAg        HIV       GBS             Legend    ^: Historical                      Physical Exam:  General:   Alert, oriented, no acute distress   Skin: Normal   Lungs:  Clear to auscultation bilaterally   Heart:  Regular rate and rhythm   Abdomen:  Gravid, non-tender   Extremities:  No edema, non-tender   Pelvis/Cervix:  3cm   Uterine Size:  size equals dates     EFM: 130s cat 1  [place]: q 3 min    Assessment/Plan      Active Problems:  No Active Problems: There are no active problems currently on the Problem List. Please update the Problem List and refresh.    PPROM, will give dose  Of steroids and induce. Desires epidural. Understands risk of tolac. Anticipate   20 minutes  _____________________________  Shiela Pabon MD  21 11:59 PM

## 2021-05-13 NOTE — PLAN OF CARE
Problem: Pain - Adult  Goal: Verbalizes/displays adequate comfort level or baseline comfort level  Description: INTERVENTIONS:  1. Encourage patient to monitor pain and request interventions  2. Assess pain using the appropriate pain scale  3. Administer analgesics based on type and severity of pain and evaluate response  4. Educate/Implement non-pharmacological measures as appropriate and evaluate response  5. Consider cultural, developmental and social influences on pain and pain management  6. Notify Provider if interventions unsuccessful or patient reports new pain  Outcome: Progressing  Flowsheets (Taken 5/12/2021 2014)  Verbalizes/displays adequate comfort level or baseline comfort level:   Encourage patient to monitor pain and request interventions   Assess pain using the appropriate pain scale   Administer analgesics based on type and severity of pain and evaluate response   Educate/Implement non-pharmacological measures as appropriate and evaluate response   Consider cultural, developmental and social influences on pain and pain management   Notify Provider if interventions unsuccessful or patient reports new pain     Problem: Infection - Adult  Goal: Absence of infection during hospitalization  Description: INTERVENTIONS:  1. Assess and monitor for signs and symptoms of infection  2. Monitor lab/diagnostic results  3. Monitor all insertion sites/wounds/incisions  4. Monitor secretions for changes in amount and color  5. Administer medications as ordered  6. Educate and encourage patient and family to use good hand hygiene technique  7. Identify and educate in appropriate isolation precautions for identified infection/condition  Outcome: Progressing  Flowsheets (Taken 5/12/2021 2014)  Absence of infection during hospitalization:   Assess and monitor for signs and symptoms of infection   Monitor lab/diagnostic results   Educate and encourage patient and family to use good hand hygiene technique      Problem: Safety Adult - Fall  Goal: Free from fall injury  Description: INTERVENTIONS:    Inpatient - Please reference Cares/Safety Flowsheet under [place] Fall Risk for interventions.  Pediatrics - Please reference Peds Daily Cares/Safety Flowsheet under [facility] Pediatric Fall Assessment Fall Bundle for interventions  LD/OB - Please reference OB Shift Screening Flowsheet under OB Fall Risk for interventions.  Outcome: Progressing  Note: Fall precautions given  Side rails up  Bed in locked and low position     Problem: Discharge Planning  Goal: Discharge to home or other facility with appropriate resources  Description: INTERVENTIONS:  1. Identify and discuss barriers to discharge with patient and caregiver.  2. Arrange for needed discharge resources and transportation as appropriate.  3. Identify discharge learning needs (meds, wound care, etc).  4. Arrange for interpreters to assist at discharge as needed.  5. Refer to  for coordinating discharge planning if the patient needs post-hospital services based on physician order or complex needs related to functional status, cognitive ability or social support system.  Outcome: Progressing  Flowsheets (Taken 2021)  Discharge to home or other facility with appropriate resources: Identify and discuss barriers to discharge with patient and caregiver     Problem: Vaginal Birth or  Section  Goal: Fetal and maternal status remain reassuring during the birth process  Description: INTERVENTIONS:  1. Monitor vital signs  2. Monitor fetal heart rate  3. Monitor uterine activity  4. Monitor labor progression (vaginal delivery)  5. DVT prophylaxis (C/S delivery)  6. Surgical antibiotic prophylaxis (C/S delivery)  Outcome: Progressing  Flowsheets (Taken 2021)  Fetal and maternal status remain reassuring during the birth process:   Monitor vital signs   Monitor fetal heart rate   Monitor uterine activity   Monitor labor progression (Vaginal  delivery)

## 2021-05-14 VITALS
HEIGHT: 60 IN | BODY MASS INDEX: 32.2 KG/M2 | HEART RATE: 57 BPM | RESPIRATION RATE: 16 BRPM | DIASTOLIC BLOOD PRESSURE: 42 MMHG | SYSTOLIC BLOOD PRESSURE: 90 MMHG | OXYGEN SATURATION: 94 % | WEIGHT: 164.02 LBS | TEMPERATURE: 97.5 F

## 2021-05-14 PROCEDURE — 2590000100 HC RX 259: Performed by: OBSTETRICS & GYNECOLOGY

## 2021-05-14 PROCEDURE — 6370000100 HC RX 637 (ALT 250 FOR IP): Performed by: OBSTETRICS & GYNECOLOGY

## 2021-05-14 RX ORDER — IBUPROFEN 800 MG/1
800 TABLET ORAL EVERY 8 HOURS
Qty: 30 TABLET | Refills: 1 | Status: SHIPPED | OUTPATIENT
Start: 2021-05-14 | End: 2021-05-24

## 2021-05-14 RX ADMIN — Medication 1 TABLET: at 09:26

## 2021-05-14 RX ADMIN — IBUPROFEN 800 MG: 800 TABLET, FILM COATED ORAL at 01:07

## 2021-05-14 RX ADMIN — CALCIUM CARBONATE (ANTACID) CHEW TAB 500 MG 750 MG: 500 CHEW TAB at 01:08

## 2021-05-14 RX ADMIN — CALCIUM CARBONATE (ANTACID) CHEW TAB 500 MG 750 MG: 500 CHEW TAB at 11:33

## 2021-05-14 RX ADMIN — IBUPROFEN 800 MG: 800 TABLET, FILM COATED ORAL at 09:25

## 2021-05-14 NOTE — PLAN OF CARE
Problem: Pain - Adult  Goal: Verbalizes/displays adequate comfort level or baseline comfort level  Description: INTERVENTIONS:  1. Encourage patient to monitor pain and request interventions  2. Assess pain using the appropriate pain scale  3. Administer analgesics based on type and severity of pain and evaluate response  4. Educate/Implement non-pharmacological measures as appropriate and evaluate response  5. Consider cultural, developmental and social influences on pain and pain management  6. Notify Provider if interventions unsuccessful or patient reports new pain  Outcome: Progressing  Flowsheets (Taken 5/13/2021 2307)  Verbalizes/displays adequate comfort level or baseline comfort level:   Encourage patient to monitor pain and request interventions   Assess pain using the appropriate pain scale   Administer analgesics based on type and severity of pain and evaluate response   Educate/Implement non-pharmacological measures as appropriate and evaluate response   Notify Provider if interventions unsuccessful or patient reports new pain     Problem: Infection - Adult  Goal: Absence of infection during hospitalization  Description: INTERVENTIONS:  1. Assess and monitor for signs and symptoms of infection  2. Monitor lab/diagnostic results  3. Monitor all insertion sites/wounds/incisions  4. Monitor secretions for changes in amount and color  5. Administer medications as ordered  6. Educate and encourage patient and family to use good hand hygiene technique  7. Identify and educate in appropriate isolation precautions for identified infection/condition  Outcome: Progressing  Flowsheets (Taken 5/13/2021 2307)  Absence of infection during hospitalization:   Assess and monitor for signs and symptoms of infection   Monitor secretions for changes in amount and colo   Administer medications as ordered     Problem: Safety Adult - Fall  Goal: Free from fall injury  Description: INTERVENTIONS:    Inpatient - Please  reference Cares/Safety Flowsheet under [place] Fall Risk for interventions.  Pediatrics - Please reference Peds Daily Cares/Safety Flowsheet under [facility] Pediatric Fall Assessment Fall Bundle for interventions  LD/OB - Please reference OB Shift Screening Flowsheet under OB Fall Risk for interventions.  Outcome: Progressing     Problem: Discharge Planning  Goal: Discharge to home or other facility with appropriate resources  Description: INTERVENTIONS:  1. Identify and discuss barriers to discharge with patient and caregiver.  2. Arrange for needed discharge resources and transportation as appropriate.  3. Identify discharge learning needs (meds, wound care, etc).  4. Arrange for interpreters to assist at discharge as needed.  5. Refer to  for coordinating discharge planning if the patient needs post-hospital services based on physician order or complex needs related to functional status, cognitive ability or social support system.  Outcome: Progressing  Flowsheets (Taken 2021 2307)  Discharge to home or other facility with appropriate resources: Identify and discuss barriers to discharge with patient and caregiver     Problem: Postpartum  Goal: Experiences normal postpartum course  Description: INTERVENTIONS:  1. Monitor maternal vital signs  2. Assess uterine involution  Outcome: Progressing  Flowsheets (Taken 2021 2307)  Experiences normal postpartum course:   Monitor maternal vital signs   Assess uterine involution  Goal: Appropriate maternal -  bonding  Description: INTERVENTIONS:  1. Identify family support  2. Referral to  or  as needed  Outcome: Progressing  Flowsheets (Taken 2021 2307)  Appropriate maternal- bonding: Identify family support  Goal: Establishment of infant feeding pattern  Description: INTERVENTIONS:  1. Assess breast/bottle feeding  2. Refer to lactation as needed  Outcome: Progressing  Flowsheets (Taken 2021  6608)  Establishment of infant feeding pattern:   Assess breast/bottle feeding   Refer to lactation as needed  Goal: Incisions, wounds, or drain sites healing without S/S of infection  Description: INTERVENTIONS  1. Assess and document risk factors for skin breakdown  2. Assess and document skin integrity  3. Assess and document dressing/incision, wound bed, drain sites and surrounding tissue  4. Implement wound care per orders  Outcome: Progressing  Flowsheets (Taken 5/13/2021 8972)  Incision(s), wound(s) or drain site(s) healing without S/S of infection: Assess and document skin integrity

## 2021-05-14 NOTE — DISCHARGE SUMMARY
Discharge Summary    BRIEF OVERVIEW  Admission Date: 2021     Discharge Date: 2021    Admission Diagnosis  36 wk iup with h/o c/s and tetralogy of falot    Discharge Diagnosis  Same    Procedures  1.   , Spontaneous    2. None     Hospital Course  uncomplicated    Results (Last 24 Hours)  No results found for this or any previous visit (from the past 24 hour(s)).    Diet   Regular    Discharge Medications     Medication List      START taking these medications    ibuprofen 800 mg tablet  Commonly known as: ADVIL,MOTRIN  Take 1 tablet (800 mg total) by mouth every 8 (eight) hours for 10 days           CONTINUE taking these medications    Prenatal 28 mg iron- 800 mcg tablet  Generic drug: MVP-ferrous fumarate-FA               Activity/Restrictions  no lifting greater than 20 pounds for 6 weeks    Follow-up   Follow up in 6 Weeks    Discharge Plan  Precautions:   · Temp greater than 100.4  · Vaginal bleeding greater than 1 pad per hour  · Incision problems  · Foul smelling vaginal odor   · Increased pain     Condition of Patient at Discharge  Good    Time spent with patient/Coordination of care: 20 minutes  -----------------------------  JEREMIAS JONES MD  21 7:51 AM

## 2021-05-14 NOTE — PROGRESS NOTES
Obstetrics Postpartum Progress Note    Date of Service: 21    Subjective   No complaints. Pt seen no c/o  Bleeding:MAL  Ambulating  well      Objective   Vitals:    21 0530   BP: 90/42   Pulse: 57   Resp: 16   Temp: 36.4 °C (97.5 °F)   SpO2: 94%       Physical Exam  General: alert and oriented  Uterus:  Firm, non tender, 2 fingerbreaths below umbilicus  Extremities: symmetric  Incision: not applicable, (vaginal delivery)    Labs  No results found for this or any previous visit (from the past 24 hour(s)).    Assessment/Plan     Kenya Lopez is a 35 y.o.  postpartum day 2 s/p , Spontaneous  delivery.     1. Routine postpartum care  2. Disposition: likely home postpartum day 2  ______________________________  JEREMIAS JONES MD  21 7:43 AM

## 2021-05-14 NOTE — PROGRESS NOTES
Obstetrics Postpartum Progress Note    Date of Service: 21    Subjective   No complaints.   Bleeding:MAL  Ambulating  well      Objective   Vitals:    21 0530   BP: 90/42   Pulse: 57   Resp: 16   Temp: 36.4 °C (97.5 °F)   SpO2: 94%       Physical Exam  General: alert and oriented  Uterus:  Firm, non tender, 2 fingerbreaths below umbilicus  Extremities: symmetric  Incision: not applicable, (vaginal delivery)    Labs  No results found for this or any previous visit (from the past 24 hour(s)).    Assessment/Plan     eKnya SUKUMAR Lopez is a 35 y.o.  postpartum day 2 s/p , Spontaneous  delivery.     1. Routine postpartum care  2. Disposition: likely home postpartum day 2  ______________________________  JEREMIAS JONES MD  21 7:44 AM

## 2021-05-14 NOTE — LACTATION NOTE
This note was copied from a baby's chart.  Lactation Consult    Reason for Consult: Initial assessment, Late  infant, Infant < 6lbs    Mother's Name: Kenya Lopez     SUBJECTIVE/OBJECTIVE  : 2021  Gestational Age: 36w0d AGA infant.   Feeding Narrative: Lac consult r/t Infant GA, 24hr Bili is 7.5. Mother reports having had other LPT infants with no breastfeeding issues noted. This is the first infant born at [facility]    Type of delivery: , Spontaneous    Birth weight 5 lb 14.6 oz (2682 g)   Weight change since birth Pct Wt Change: -1.64 %   Current/Previous Weight Patient Weights for the past 8760 hrs (Last 2 readings):   Weight   21 0000 2638 g   21 2342 2682 g      Weight Change  Since Last Visit: -44 g       Maternal history includes:  36 y.o.      Patient Active Problem List    Diagnosis Date Noted   • Vaginal delivery 2021   • Acute cholecystitis due to biliary calculus 2020   • Contraceptive use education 2018   • Fallot tetralogy    • Ventricular septal defect    • Lower abdominal pain 2018      Past Medical History:   Diagnosis Date   • Cardiovascular disease    • Fallot tetralogy    • Fallot tetralogy    • History of aortic valve repair    • History of tetralogy of Fallot repair    • Murmur    • Ventricular septal defect        Past Surgical History:   Procedure Laterality Date   • CARDIAC SURGERY     • CHOLECYSTECTOMY  2020    Robotic Assisted Laparoscopic Cholecystectomy [Xilapcho], Foreign Body Removal [Forbod]   • TONSILLECTOMY        Social History     Tobacco Use   • Smoking status: Current Every Day Smoker     Packs/day: 0.50     Types: Cigarettes   • Smokeless tobacco: Never Used   • Tobacco comment: 5 cigarettes daily   Substance and Sexual Activity   • Alcohol use: Not Currently     Comment: rarely   • Drug use: No   • Sexual activity: Defer   Social History Narrative   • Not on file      Has mother  before?: Yes  How long  did the the mother previously breastfeed?: previous children 8mo-1yr         ASSESSMENT  Left Breast: Pendulous Right Breast: Pendulous Left Nipple: WDL Right Nipple: WDL          Pre-Consult Assessment   Feeding Frequency: cluster feeding in the night noted, Mother then woke infant at thre 3hrs, LC enc mom to continue to wake if infant attempts sleeping through feedings  Feeding Duration: 30-60mins  Latch Assessment: Sustained  Latch Pain: 0  Supplementation: No    Post-Consult Assessment  Feeding Duration: Mother feeding infant when LC enters room. Infan sustains x 10mins actively suckling before falling asleep  Latch Assessment: Correct, Sustained  Latch Pain: 0  Supplementation: No  Interventions: Reposition Latch at Breast     Pump: None    PLAN  Mother to continue to exclusively breastfeed.General breastfeeding and out patient information provided. Plans for LC to follow up prn.     -----------------------------------------------  Teresa Martel RN  05/14/21 10:02 AM

## 2021-05-14 NOTE — PLAN OF CARE
Problem: Pain - Adult  Goal: Verbalizes/displays adequate comfort level or baseline comfort level  Description: INTERVENTIONS:  1. Encourage patient to monitor pain and request interventions  2. Assess pain using the appropriate pain scale  3. Administer analgesics based on type and severity of pain and evaluate response  4. Educate/Implement non-pharmacological measures as appropriate and evaluate response  5. Consider cultural, developmental and social influences on pain and pain management  6. Notify Provider if interventions unsuccessful or patient reports new pain  Outcome: Progressing  Flowsheets (Taken 5/13/2021 2307 by Padma Mckeon RN)  Verbalizes/displays adequate comfort level or baseline comfort level:   Encourage patient to monitor pain and request interventions   Assess pain using the appropriate pain scale   Administer analgesics based on type and severity of pain and evaluate response   Educate/Implement non-pharmacological measures as appropriate and evaluate response   Notify Provider if interventions unsuccessful or patient reports new pain     Problem: Infection - Adult  Goal: Absence of infection during hospitalization  Description: INTERVENTIONS:  1. Assess and monitor for signs and symptoms of infection  2. Monitor lab/diagnostic results  3. Monitor all insertion sites/wounds/incisions  4. Monitor secretions for changes in amount and color  5. Administer medications as ordered  6. Educate and encourage patient and family to use good hand hygiene technique  7. Identify and educate in appropriate isolation precautions for identified infection/condition  Outcome: Progressing  Flowsheets (Taken 5/13/2021 2307 by Padma Mckeon RN)  Absence of infection during hospitalization:   Assess and monitor for signs and symptoms of infection   Monitor secretions for changes in amount and colo   Administer medications as ordered     Problem: Safety Adult - Fall  Goal: Free from fall  injury  Description: INTERVENTIONS:    Inpatient - Please reference Cares/Safety Flowsheet under [place] Fall Risk for interventions.  Pediatrics - Please reference Peds Daily Cares/Safety Flowsheet under [facility] Pediatric Fall Assessment Fall Bundle for interventions  LD/OB - Please reference OB Shift Screening Flowsheet under OB Fall Risk for interventions.  Outcome: Progressing     Problem: Discharge Planning  Goal: Discharge to home or other facility with appropriate resources  Description: INTERVENTIONS:  1. Identify and discuss barriers to discharge with patient and caregiver.  2. Arrange for needed discharge resources and transportation as appropriate.  3. Identify discharge learning needs (meds, wound care, etc).  4. Arrange for interpreters to assist at discharge as needed.  5. Refer to  for coordinating discharge planning if the patient needs post-hospital services based on physician order or complex needs related to functional status, cognitive ability or social support system.  Outcome: Progressing  Flowsheets (Taken 2021 by Padma Mckeon RN)  Discharge to home or other facility with appropriate resources: Identify and discuss barriers to discharge with patient and caregiver     Problem: Postpartum  Goal: Experiences normal postpartum course  Description: INTERVENTIONS:  1. Monitor maternal vital signs  2. Assess uterine involution  Outcome: Progressing  Flowsheets (Taken 2021 230 by Padma Mckeon, RN)  Experiences normal postpartum course:   Monitor maternal vital signs   Assess uterine involution  Goal: Appropriate maternal -  bonding  Description: INTERVENTIONS:  1. Identify family support  2. Referral to  or  as needed  Outcome: Progressing  Flowsheets (Taken 2021 by Padma Mckeon RN)  Appropriate maternal- bonding: Identify family support  Goal: Establishment of infant feeding pattern  Description:  INTERVENTIONS:  1. Assess breast/bottle feeding  2. Refer to lactation as needed  Outcome: Progressing  Flowsheets (Taken 5/13/2021 2307 by Padma Mckeon RN)  Establishment of infant feeding pattern:   Assess breast/bottle feeding   Refer to lactation as needed  Goal: Incisions, wounds, or drain sites healing without S/S of infection  Description: INTERVENTIONS  1. Assess and document risk factors for skin breakdown  2. Assess and document skin integrity  3. Assess and document dressing/incision, wound bed, drain sites and surrounding tissue  4. Implement wound care per orders  Outcome: Progressing  Flowsheets (Taken 5/13/2021 2307 by Padma Mckeon, RN)  Incision(s), wound(s) or drain site(s) healing without S/S of infection: Assess and document skin integrity

## 2021-05-14 NOTE — PLAN OF CARE
Problem: Pain - Adult  Goal: Verbalizes/displays adequate comfort level or baseline comfort level  Description: INTERVENTIONS:  1. Encourage patient to monitor pain and request interventions  2. Assess pain using the appropriate pain scale  3. Administer analgesics based on type and severity of pain and evaluate response  4. Educate/Implement non-pharmacological measures as appropriate and evaluate response  5. Consider cultural, developmental and social influences on pain and pain management  6. Notify Provider if interventions unsuccessful or patient reports new pain  5/14/2021 1137 by Omaira Pace RN  Outcome: Completed  5/14/2021 0746 by Omaira Pace RN  Outcome: Progressing  Flowsheets (Taken 5/13/2021 2307 by Padma Mckeon RN)  Verbalizes/displays adequate comfort level or baseline comfort level:   Encourage patient to monitor pain and request interventions   Assess pain using the appropriate pain scale   Administer analgesics based on type and severity of pain and evaluate response   Educate/Implement non-pharmacological measures as appropriate and evaluate response   Notify Provider if interventions unsuccessful or patient reports new pain     Problem: Infection - Adult  Goal: Absence of infection during hospitalization  Description: INTERVENTIONS:  1. Assess and monitor for signs and symptoms of infection  2. Monitor lab/diagnostic results  3. Monitor all insertion sites/wounds/incisions  4. Monitor secretions for changes in amount and color  5. Administer medications as ordered  6. Educate and encourage patient and family to use good hand hygiene technique  7. Identify and educate in appropriate isolation precautions for identified infection/condition  5/14/2021 1137 by Omaira Pace RN  Outcome: Completed  5/14/2021 0746 by Omaira Pace RN  Outcome: Progressing  Flowsheets (Taken 5/13/2021 2307 by Padma Mckeon RN)  Absence of infection during hospitalization:   Assess and monitor for  signs and symptoms of infection   Monitor secretions for changes in amount and colo   Administer medications as ordered     Problem: Safety Adult - Fall  Goal: Free from fall injury  Description: INTERVENTIONS:    Inpatient - Please reference Cares/Safety Flowsheet under [place] Fall Risk for interventions.  Pediatrics - Please reference Peds Daily Cares/Safety Flowsheet under [facility] Pediatric Fall Assessment Fall Bundle for interventions  LD/OB - Please reference OB Shift Screening Flowsheet under OB Fall Risk for interventions.  5/14/2021 1137 by Omaira Pace RN  Outcome: Completed  5/14/2021 0746 by Omaira Pace RN  Outcome: Progressing     Problem: Discharge Planning  Goal: Discharge to home or other facility with appropriate resources  Description: INTERVENTIONS:  1. Identify and discuss barriers to discharge with patient and caregiver.  2. Arrange for needed discharge resources and transportation as appropriate.  3. Identify discharge learning needs (meds, wound care, etc).  4. Arrange for interpreters to assist at discharge as needed.  5. Refer to  for coordinating discharge planning if the patient needs post-hospital services based on physician order or complex needs related to functional status, cognitive ability or social support system.  5/14/2021 1137 by Omaira Pace RN  Outcome: Completed  5/14/2021 0746 by Omaira Pace RN  Outcome: Progressing  Flowsheets (Taken 5/13/2021 2307 by Padma Mckeon RN)  Discharge to home or other facility with appropriate resources: Identify and discuss barriers to discharge with patient and caregiver     Problem: Postpartum  Goal: Experiences normal postpartum course  Description: INTERVENTIONS:  1. Monitor maternal vital signs  2. Assess uterine involution  5/14/2021 1137 by Omaira Pace RN  Outcome: Completed  5/14/2021 0746 by Omaira Pace RN  Outcome: Progressing  Flowsheets (Taken 5/13/2021 2307 by Padma Mckeon RN)  Experiences  normal postpartum course:   Monitor maternal vital signs   Assess uterine involution  Goal: Appropriate maternal -  bonding  Description: INTERVENTIONS:  1. Identify family support  2. Referral to  or  as needed  2021 by Omaira Pace RN  Outcome: Completed  2021 by Omaira Pace RN  Outcome: Progressing  Flowsheets (Taken 2021 by Padma Mckeon, RN)  Appropriate maternal- bonding: Identify family support  Goal: Establishment of infant feeding pattern  Description: INTERVENTIONS:  1. Assess breast/bottle feeding  2. Refer to lactation as needed  2021 by Omaira Pace RN  Outcome: Completed  2021 by Omaira Pace RN  Outcome: Progressing  Flowsheets (Taken 2021 by Padma Mckeon RN)  Establishment of infant feeding pattern:   Assess breast/bottle feeding   Refer to lactation as needed  Goal: Incisions, wounds, or drain sites healing without S/S of infection  Description: INTERVENTIONS  1. Assess and document risk factors for skin breakdown  2. Assess and document skin integrity  3. Assess and document dressing/incision, wound bed, drain sites and surrounding tissue  4. Implement wound care per orders  2021 by Omaira Pace RN  Outcome: Completed  2021 by Omaira Pace RN  Outcome: Progressing  Flowsheets (Taken 2021 by Padma Mckeon RN)  Incision(s), wound(s) or drain site(s) healing without S/S of infection: Assess and document skin integrity

## 2021-05-14 NOTE — DISCHARGE INSTRUCTIONS
Postpartum Discharge Instructions  Breast-Feeding  • Feed your baby on demand.  Breastfeeding every 1 ½ -3 hours is normal. You create more milk by breastfeeding more often  • Drink water when thirsty.  Eat a balanced diet.  Most things you eat and drink enter the breast milk including caffeine, alcohol, and medicines.  • The most common cause of pain with breastfeeding is because the baby is not latched on right.  See the Celebrate the Family Book, for more information.  • Call Lactation consultants services [phone] if you have any breastfeeding questions or concerns.  • Breast-milk may be stored in a refrigerator for up to 3 days and in a freezer for 3-6 months.  Do not use a microwave to thaw frozen milk.  Using warm water for thawing is the best way.  • Call your provider if you have red, tender areas on your breast along with a fever.      Bottle-Feeding  • Breast Care:  Wear a comfortable, supportive bra.  Use pain medicines (Acetaminophen) as needed to relieve swelling and pressure.  Ice packs may be put on the breasts 4 times a day for 15 minutes each time for comfort. Avoid breast stimulation such as hot showers directly on the breasts or expressing milk.  • Rest and relax when the baby sleeps, at least 2 times a day  • Do not heat formula in the microwave ---it may cause severe burns  • Feed the baby on demand.   Hold the baby in your arms when feeding the baby.  After every ½ to 1 ounce or more the baby drinks, burp the baby.  Never leave the baby with a propped bottle.  If your baby does not finish the whole bottle, throw it away the unused formula.      Episiotomy/Perineal Care  • Watch for increased soreness, drainage from episiotomy and stitches coming apart.  • Soak in a warm tub 2-3 times a day until you longer feel pain.  • Use good hand washing at all times.  Wipe perineal area from front to back to prevent infection  • No tampons, swimming, hot tubs, or douching for 4-6 weeks  • Use  medicines as directed for constipation      Sexual Activity  • No sex for 6 weeks  • When you resume sex, use water-soluble jelly (K-Y) or contraceptive cream if you notice a need for more vaginal lubrication  • Breast-feeding is not a form of birth control.  • When you resume sex, foams and condoms are a good form of birth control and prevent sexually transmitted diseases, when used right.  • Follow your providers suggestions for birth control.          Diet  • Eat a well-balanced diet; choose from all food groups for each meal (milk or dairy, meat, fruits, vegetables, grains.)      When to call your provider  • Heavy or bright red bleeding (more than 1 pad per hour) not related to increased activity.  Change in bleeding back to dark red clots.  • Bad smell to the vaginal discharge  • If you have pain, burning, trouble, or frequency with urination  • Lower back pain  • Chills, fever above 100.4 degrees F, Feeling like you have the flu, or increased stomach tenderness or pain  • Pain, redness, swelling, increased warmth in calf or any part of the leg.  • Sadness or blues lasting longer than 2 weeks or thoughts of hurting yourself or the baby

## 2021-05-16 ENCOUNTER — TELEPHONE (OUTPATIENT)
Dept: OBSTETRICS AND GYNECOLOGY | Facility: HOSPITAL | Age: 36
End: 2021-05-16

## 2022-02-07 RX ORDER — MELOXICAM 15 MG/1
TABLET ORAL
Qty: 30 TABLET | OUTPATIENT
Start: 2022-02-07

## (undated) DEVICE — SUTURE VICRYL 0 UR6 27" VIOLET

## (undated) DEVICE — OBTURATOR OPTI 8MM BLADELESS

## (undated) DEVICE — GLOVE NEOLON 2G 6.0 SURG

## (undated) DEVICE — GLOVE 7 DERMAPRENE ULTRA POWDER FREE 8514

## (undated) DEVICE — SEAL CANNULA 5-8MM IS4000 PORT

## (undated) DEVICE — GOWN SURGICAL LARGE LEVEL 4

## (undated) DEVICE — GLOVE BIOGEL PI IND 7.0 SURGICAL

## (undated) DEVICE — GLOVE SENSICARE 8.0 SURG

## (undated) DEVICE — NEEDLE VERRES 150MM

## (undated) DEVICE — KIT LAP SCOPE WARMER D-HELP

## (undated) DEVICE — COVER LIGHT HANDLE STERIS

## (undated) DEVICE — GLOVE SURG PROTEXIS PI BLUE SZ 7.0 UNDERGLOVE

## (undated) DEVICE — TRAY LAP CHOLE CUSTOM RCRH CUSTOM PACK

## (undated) DEVICE — CONTAINER SPECIMEN 4OZ STL SCREW TOP BLISTER PACK

## (undated) DEVICE — DECANTER VIAL

## (undated) DEVICE — DRAPE COLUMN DA VINCI X/XI

## (undated) DEVICE — GLOVE GAMMEX SZ 8 STL GREEN POWDER FREE

## (undated) DEVICE — DRAPE INSTRUMENT ARM IS4000

## (undated) DEVICE — SUTURE MONOCRYL 4-0 PS-2 18" UNDYED

## (undated) DEVICE — CLIP HEMOLOK MED - LG

## (undated) DEVICE — COVER TIP ACCESSORY DAVINCI 8MM DISPOSABLE

## (undated) DEVICE — TUBING INSUFFLATION DUAL CONNECTOR

## (undated) DEVICE — BAG TISSUE RETRIEVAL 125ML